# Patient Record
Sex: FEMALE
[De-identification: names, ages, dates, MRNs, and addresses within clinical notes are randomized per-mention and may not be internally consistent; named-entity substitution may affect disease eponyms.]

---

## 2017-01-18 ENCOUNTER — LAB SERVICES (OUTPATIENT)
Dept: OTHER | Age: 32
End: 2017-01-18

## 2017-01-18 ENCOUNTER — CHARTING TRANS (OUTPATIENT)
Dept: OTHER | Age: 32
End: 2017-01-18

## 2017-01-18 LAB
BILIRUBIN URINE: NEGATIVE
BLOOD URINE: ABNORMAL
CLARITY: CLEAR
COLOR: YELLOW
GLUCOSE QUALITATIVE U: NEGATIVE
HBV SURFACE AG SERPL QL IA: NEGATIVE
HEMATOCRIT: 36.8 % (ref 34–45)
HEMOGLOBIN: 13.2 G/DL (ref 11.2–15.7)
HIV1+2 AB SERPL QL IA: NEGATIVE
KETONES, URINE: NEGATIVE
LEUKOCYTE ESTERASE URINE: NEGATIVE
MEAN CORPUSCULAR HGB CONCENTRATION: 35.9 % (ref 32–36)
MEAN CORPUSCULAR HGB: 31.1 PG (ref 27–34)
MEAN CORPUSCULAR VOLUME: 86.8 FL (ref 79–95)
MEAN PLATELET VOLUME: 9.5 FL (ref 8.6–12.4)
NITRITE URINE: NEGATIVE
PH URINE: 6 (ref 5–7)
PLATELET COUNT: 306 10*3/UL (ref 150–400)
PROGESTERONE: 25.5 NG/ML
RED BLOOD CELL COUNT: 4.24 10*6/UL (ref 3.7–5.2)
RED CELL DISTRIBUTION WIDTH: 12 % (ref 11.3–14.8)
RPR SER QL: NORMAL
RUBV IGG SERPL QL IA: NORMAL [IU]/ML
SPECIFIC GRAVITY URINE: 1.02 (ref 1–1.03)
URINE PROTEIN, QUAL (DIPSTICK): NEGATIVE
UROBILINOGEN URINE: 0.2
WHITE BLOOD CELL COUNT: 8 10*3/UL (ref 4–10)

## 2017-01-19 ENCOUNTER — CHARTING TRANS (OUTPATIENT)
Dept: OTHER | Age: 32
End: 2017-01-19

## 2017-01-19 LAB
T GONDII IGG SER-ACNC: <0.5 UNITS/ML
T GONDII IGM SER-ACNC: 0.24 INDEX

## 2017-01-20 ENCOUNTER — CHARTING TRANS (OUTPATIENT)
Dept: OTHER | Age: 32
End: 2017-01-20

## 2017-01-20 LAB
ABO/RH(D): NORMAL
ANTIBODY SCREEN: NEGATIVE
B19V IGG SER IA-ACNC: 4.2 IV
B19V IGM SER IA-ACNC: 0.2 IV

## 2017-01-27 ENCOUNTER — CHARTING TRANS (OUTPATIENT)
Dept: OTHER | Age: 32
End: 2017-01-27

## 2017-02-01 ENCOUNTER — IMAGING SERVICES (OUTPATIENT)
Dept: OTHER | Age: 32
End: 2017-02-01

## 2017-02-06 ENCOUNTER — CHARTING TRANS (OUTPATIENT)
Dept: OTHER | Age: 32
End: 2017-02-06

## 2017-02-21 ENCOUNTER — CHARTING TRANS (OUTPATIENT)
Dept: OTHER | Age: 32
End: 2017-02-21

## 2017-03-13 ENCOUNTER — CHARTING TRANS (OUTPATIENT)
Dept: OTHER | Age: 32
End: 2017-03-13

## 2017-03-20 ENCOUNTER — CHARTING TRANS (OUTPATIENT)
Dept: OTHER | Age: 32
End: 2017-03-20

## 2017-03-22 ENCOUNTER — CHARTING TRANS (OUTPATIENT)
Dept: OBGYN | Age: 32
End: 2017-03-22

## 2017-04-01 ENCOUNTER — LAB SERVICES (OUTPATIENT)
Dept: OTHER | Age: 32
End: 2017-04-01

## 2017-04-01 ENCOUNTER — CHARTING TRANS (OUTPATIENT)
Dept: OTHER | Age: 32
End: 2017-04-01

## 2017-04-01 ENCOUNTER — CHARTING TRANS (OUTPATIENT)
Dept: OBGYN | Age: 32
End: 2017-04-01

## 2017-04-01 LAB
BILIRUBIN URINE: NEGATIVE
BLOOD URINE: NEGATIVE
CLARITY: CLEAR
COLOR: YELLOW
GLUCOSE QUALITATIVE U: NEGATIVE
KETONES, URINE: NEGATIVE
LEUKOCYTE ESTERASE URINE: NEGATIVE
NITRITE URINE: NEGATIVE
PH URINE: 6 (ref 5–7)
SPECIFIC GRAVITY URINE: <=1.005 (ref 1–1.03)
URINE PROTEIN, QUAL (DIPSTICK): NEGATIVE
UROBILINOGEN URINE: 0.2

## 2017-04-03 ENCOUNTER — CHARTING TRANS (OUTPATIENT)
Dept: OTHER | Age: 32
End: 2017-04-03

## 2017-04-15 ENCOUNTER — CHARTING TRANS (OUTPATIENT)
Dept: OTHER | Age: 32
End: 2017-04-15

## 2017-05-02 ENCOUNTER — CHARTING TRANS (OUTPATIENT)
Dept: OTHER | Age: 32
End: 2017-05-02

## 2017-05-02 ENCOUNTER — IMAGING SERVICES (OUTPATIENT)
Dept: OTHER | Age: 32
End: 2017-05-02

## 2017-05-16 ENCOUNTER — IMAGING SERVICES (OUTPATIENT)
Dept: OTHER | Age: 32
End: 2017-05-16

## 2017-06-06 ENCOUNTER — CHARTING TRANS (OUTPATIENT)
Dept: OBGYN | Age: 32
End: 2017-06-06

## 2017-06-14 ENCOUNTER — LAB SERVICES (OUTPATIENT)
Dept: OTHER | Age: 32
End: 2017-06-14

## 2017-06-14 LAB
BASOPHIL %: 0.1 % (ref 0–1.2)
BASOPHIL ABSOLUTE #: 0 10*3/UL (ref 0–0.1)
DIFFERENTIAL TYPE: ABNORMAL
EOSINOPHIL %: 2.3 % (ref 0–10)
EOSINOPHIL ABSOLUTE #: 0.2 10*3/UL (ref 0–0.5)
GLUCOSE 1H P 50 G GLC PO SERPL-MCNC: 88 MG/DL (ref 70–130)
HEMATOCRIT: 32.7 % (ref 34–45)
HEMOGLOBIN: 11 G/DL (ref 11.2–15.7)
LYMPH PERCENT: 12.7 % (ref 20.5–51.1)
LYMPHOCYTE ABSOLUTE #: 1.2 10*3/UL (ref 1.2–3.4)
MEAN CORPUSCULAR HGB CONCENTRATION: 33.6 % (ref 32–36)
MEAN CORPUSCULAR HGB: 29.6 PG (ref 27–34)
MEAN CORPUSCULAR VOLUME: 88.1 FL (ref 79–95)
MEAN PLATELET VOLUME: 10.6 FL (ref 8.6–12.4)
MONOCYTE ABSOLUTE #: 0.7 10*3/UL (ref 0.2–0.9)
MONOCYTE PERCENT: 6.8 % (ref 4.3–12.9)
NEUTROPHIL ABSOLUTE #: 7.6 10*3/UL (ref 1.4–6.5)
NEUTROPHIL PERCENT: 78.1 % (ref 34–73.5)
PLATELET COUNT: 240 10*3/UL (ref 150–400)
RED BLOOD CELL COUNT: 3.71 10*6/UL (ref 3.7–5.2)
RED CELL DISTRIBUTION WIDTH: 13 % (ref 11.3–14.8)
WHITE BLOOD CELL COUNT: 9.7 10*3/UL (ref 4–10)

## 2017-06-26 ENCOUNTER — CHARTING TRANS (OUTPATIENT)
Dept: OTHER | Age: 32
End: 2017-06-26

## 2017-07-10 ENCOUNTER — CHARTING TRANS (OUTPATIENT)
Dept: OTHER | Age: 32
End: 2017-07-10

## 2017-07-13 ENCOUNTER — CHARTING TRANS (OUTPATIENT)
Dept: OTHER | Age: 32
End: 2017-07-13

## 2017-07-31 ENCOUNTER — CHARTING TRANS (OUTPATIENT)
Dept: OTHER | Age: 32
End: 2017-07-31

## 2017-08-08 ENCOUNTER — CHARTING TRANS (OUTPATIENT)
Dept: OTHER | Age: 32
End: 2017-08-08

## 2017-08-09 ENCOUNTER — CHARTING TRANS (OUTPATIENT)
Dept: OBGYN | Age: 32
End: 2017-08-09

## 2017-08-17 ENCOUNTER — LAB SERVICES (OUTPATIENT)
Dept: OTHER | Age: 32
End: 2017-08-17

## 2017-08-17 ENCOUNTER — CHARTING TRANS (OUTPATIENT)
Dept: OBGYN | Age: 32
End: 2017-08-17

## 2017-08-24 ENCOUNTER — CHARTING TRANS (OUTPATIENT)
Dept: OTHER | Age: 32
End: 2017-08-24

## 2017-08-24 LAB — FINAL REPORT: ABNORMAL

## 2017-09-01 ENCOUNTER — CHARTING TRANS (OUTPATIENT)
Dept: OBGYN | Age: 32
End: 2017-09-01

## 2017-09-07 ENCOUNTER — CHARTING TRANS (OUTPATIENT)
Dept: OTHER | Age: 32
End: 2017-09-07

## 2017-09-14 ENCOUNTER — CHARTING TRANS (OUTPATIENT)
Dept: OTHER | Age: 32
End: 2017-09-14

## 2017-09-15 ENCOUNTER — LAB SERVICES (OUTPATIENT)
Dept: OTHER | Age: 32
End: 2017-09-15

## 2017-09-15 ENCOUNTER — CHARTING TRANS (OUTPATIENT)
Dept: OTHER | Age: 32
End: 2017-09-15

## 2017-09-18 ENCOUNTER — CHARTING TRANS (OUTPATIENT)
Dept: OTHER | Age: 32
End: 2017-09-18

## 2017-09-19 LAB — TXT: NORMAL

## 2017-09-21 ENCOUNTER — CHARTING TRANS (OUTPATIENT)
Dept: OTHER | Age: 32
End: 2017-09-21

## 2017-09-25 ENCOUNTER — CHARTING TRANS (OUTPATIENT)
Dept: OTHER | Age: 32
End: 2017-09-25

## 2017-09-26 ENCOUNTER — CHARTING TRANS (OUTPATIENT)
Dept: OTHER | Age: 32
End: 2017-09-26

## 2017-10-05 ENCOUNTER — CHARTING TRANS (OUTPATIENT)
Dept: OTHER | Age: 32
End: 2017-10-05

## 2017-10-20 ENCOUNTER — CHARTING TRANS (OUTPATIENT)
Dept: OTHER | Age: 32
End: 2017-10-20

## 2017-10-23 ENCOUNTER — LAB SERVICES (OUTPATIENT)
Dept: OTHER | Age: 32
End: 2017-10-23

## 2017-10-23 ENCOUNTER — HOSPITAL ENCOUNTER (OUTPATIENT)
Age: 32
Discharge: ANOTHER HOSPITAL | End: 2017-10-23
Attending: FAMILY MEDICINE
Payer: COMMERCIAL

## 2017-10-23 ENCOUNTER — CHARTING TRANS (OUTPATIENT)
Dept: OTHER | Age: 32
End: 2017-10-23

## 2017-10-23 VITALS
TEMPERATURE: 100 F | HEART RATE: 118 BPM | DIASTOLIC BLOOD PRESSURE: 76 MMHG | OXYGEN SATURATION: 98 % | SYSTOLIC BLOOD PRESSURE: 94 MMHG | RESPIRATION RATE: 20 BRPM | WEIGHT: 150 LBS

## 2017-10-23 DIAGNOSIS — I95.9 HYPOTENSION, UNSPECIFIED HYPOTENSION TYPE: ICD-10-CM

## 2017-10-23 DIAGNOSIS — M79.602 PAIN IN BOTH UPPER EXTREMITIES: ICD-10-CM

## 2017-10-23 DIAGNOSIS — R00.0 TACHYCARDIA: ICD-10-CM

## 2017-10-23 DIAGNOSIS — M54.9 ACUTE UPPER BACK PAIN: ICD-10-CM

## 2017-10-23 DIAGNOSIS — R42 DIZZINESS: ICD-10-CM

## 2017-10-23 DIAGNOSIS — N61.0 ACUTE MASTITIS OF LEFT BREAST: Primary | ICD-10-CM

## 2017-10-23 DIAGNOSIS — M79.601 PAIN IN BOTH UPPER EXTREMITIES: ICD-10-CM

## 2017-10-23 LAB — MRSA SCREEN PCR: NORMAL

## 2017-10-23 PROCEDURE — 99205 OFFICE O/P NEW HI 60 MIN: CPT

## 2017-10-23 PROCEDURE — 93010 ELECTROCARDIOGRAM REPORT: CPT

## 2017-10-23 PROCEDURE — 93005 ELECTROCARDIOGRAM TRACING: CPT

## 2017-10-23 RX ORDER — IBUPROFEN 400 MG/1
400 TABLET ORAL EVERY 6 HOURS PRN
COMMUNITY
End: 2019-02-14

## 2017-10-23 RX ORDER — DICLOXACILLIN SODIUM 250 MG/1
250 CAPSULE ORAL 4 TIMES DAILY
COMMUNITY
End: 2018-10-22

## 2017-10-23 RX ORDER — SODIUM CHLORIDE 9 MG/ML
INJECTION, SOLUTION INTRAVENOUS CONTINUOUS
Status: DISCONTINUED | OUTPATIENT
Start: 2017-10-23 | End: 2017-10-23

## 2017-10-23 NOTE — ED INITIAL ASSESSMENT (HPI)
X1 WK Pt c/o constant sharp back pain that is starting to radiate into shoulder/arm/hands. Thurs Pt was started on Dicloxacillin for a clogged milk duct. +fevers    10/22 Pt c/o light headedness.

## 2017-10-23 NOTE — ED PROVIDER NOTES
Patient Seen in: [id number] [facility]    History   Patient presents with:  Back Pain (musculoskeletal)    Stated Complaint: infected milk duct/neck back pain    HPI    50-year-old female who is currently 6 weeks postpartum presents to the clin Physical Exam    General appearance: alert, appears stated age and cooperative. Patient is laying on the exam table. Becomes dizzy and lightheaded when she sits up.   Head: Normocephalic, without obvious abnormality, atraumatic  Eyes: conjunctivae she is postpartum there is always concern for pulmonary embolism. Patient denies any chest pain or shortness of breath at present. She needs to be ruled out for breast abscess/sepsis and also need to be ruled out for pulmonary embolism.  Recommend jose alfredo

## 2017-10-24 ENCOUNTER — CHARTING TRANS (OUTPATIENT)
Dept: OTHER | Age: 32
End: 2017-10-24

## 2017-10-24 ENCOUNTER — LAB SERVICES (OUTPATIENT)
Dept: OTHER | Age: 32
End: 2017-10-24

## 2017-10-25 LAB
CULTURE URINE: NORMAL
CULTURE WOUND WITH GRAM: ABNORMAL

## 2017-10-26 ENCOUNTER — CHARTING TRANS (OUTPATIENT)
Dept: OTHER | Age: 32
End: 2017-10-26

## 2017-10-27 ENCOUNTER — LAB SERVICES (OUTPATIENT)
Dept: OTHER | Age: 32
End: 2017-10-27

## 2017-10-27 LAB — CLOSTRIDIUM DIFFICILE TOXIN PCR: NORMAL

## 2017-10-28 ENCOUNTER — LAB SERVICES (OUTPATIENT)
Dept: OTHER | Age: 32
End: 2017-10-28

## 2017-10-28 ENCOUNTER — CHARTING TRANS (OUTPATIENT)
Dept: OTHER | Age: 32
End: 2017-10-28

## 2017-10-28 LAB
CULTURE BLOOD: NORMAL
CULTURE BLOOD: NORMAL

## 2017-10-30 ENCOUNTER — CHARTING TRANS (OUTPATIENT)
Dept: OTHER | Age: 32
End: 2017-10-30

## 2017-10-30 ENCOUNTER — LAB SERVICES (OUTPATIENT)
Dept: OTHER | Age: 32
End: 2017-10-30

## 2017-10-30 LAB
CULTURE ANAEROBIC: NORMAL
CULTURE BODY FLUID WITH GRAM: ABNORMAL
DIFFERENTIAL TYPE: ABNORMAL
HEMATOCRIT: 35.4 % (ref 34–45)
HEMOGLOBIN: 12.2 G/DL (ref 11.2–15.7)
MEAN CORPUSCULAR HGB CONCENTRATION: 34.5 % (ref 32–36)
MEAN CORPUSCULAR HGB: 28 PG (ref 27–34)
MEAN CORPUSCULAR VOLUME: 81.4 FL (ref 79–95)
MEAN PLATELET VOLUME: 10.1 FL (ref 8.6–12.4)
PLATELET COUNT: 163 10*3/UL (ref 150–400)
RED BLOOD CELL COUNT: 4.35 10*6/UL (ref 3.7–5.2)
RED CELL DISTRIBUTION WIDTH: 16.4 % (ref 11.3–14.8)
WHITE BLOOD CELL COUNT: 16 10*3/UL (ref 4–10)

## 2017-11-02 LAB
CULTURE ANAEROBIC: NORMAL
CULTURE BLOOD: NORMAL
CULTURE BLOOD: NORMAL

## 2017-11-03 ENCOUNTER — CHARTING TRANS (OUTPATIENT)
Dept: OTHER | Age: 32
End: 2017-11-03

## 2017-11-06 ENCOUNTER — CHARTING TRANS (OUTPATIENT)
Dept: OTHER | Age: 32
End: 2017-11-06

## 2017-11-06 ENCOUNTER — IMAGING SERVICES (OUTPATIENT)
Dept: OTHER | Age: 32
End: 2017-11-06

## 2017-11-10 ENCOUNTER — CHARTING TRANS (OUTPATIENT)
Dept: SURGERY | Age: 32
End: 2017-11-10

## 2017-11-14 ENCOUNTER — CHARTING TRANS (OUTPATIENT)
Dept: OTHER | Age: 32
End: 2017-11-14

## 2017-11-27 ENCOUNTER — CHARTING TRANS (OUTPATIENT)
Dept: OTHER | Age: 32
End: 2017-11-27

## 2017-12-01 ENCOUNTER — CHARTING TRANS (OUTPATIENT)
Dept: OTHER | Age: 32
End: 2017-12-01

## 2017-12-04 ENCOUNTER — CHARTING TRANS (OUTPATIENT)
Dept: OTHER | Age: 32
End: 2017-12-04

## 2017-12-05 ENCOUNTER — CHARTING TRANS (OUTPATIENT)
Dept: OTHER | Age: 32
End: 2017-12-05

## 2017-12-11 ENCOUNTER — CHARTING TRANS (OUTPATIENT)
Dept: SURGERY | Age: 32
End: 2017-12-11

## 2018-01-15 ENCOUNTER — CHARTING TRANS (OUTPATIENT)
Dept: OTHER | Age: 33
End: 2018-01-15

## 2018-02-07 ENCOUNTER — CHARTING TRANS (OUTPATIENT)
Dept: OTHER | Age: 33
End: 2018-02-07

## 2018-02-07 ENCOUNTER — IMAGING SERVICES (OUTPATIENT)
Dept: OTHER | Age: 33
End: 2018-02-07

## 2018-02-08 ENCOUNTER — CHARTING TRANS (OUTPATIENT)
Dept: OTHER | Age: 33
End: 2018-02-08

## 2018-02-13 ENCOUNTER — CHARTING TRANS (OUTPATIENT)
Dept: OTHER | Age: 33
End: 2018-02-13

## 2018-02-19 ENCOUNTER — CHARTING TRANS (OUTPATIENT)
Dept: OTHER | Age: 33
End: 2018-02-19

## 2018-03-10 ENCOUNTER — MYAURORA ACCOUNT LINK (OUTPATIENT)
Dept: OTHER | Age: 33
End: 2018-03-10

## 2018-03-10 ENCOUNTER — CHARTING TRANS (OUTPATIENT)
Dept: OTHER | Age: 33
End: 2018-03-10

## 2018-05-01 ENCOUNTER — CHARTING TRANS (OUTPATIENT)
Dept: OTHER | Age: 33
End: 2018-05-01

## 2018-05-09 ENCOUNTER — CHARTING TRANS (OUTPATIENT)
Dept: OTHER | Age: 33
End: 2018-05-09

## 2018-05-30 ENCOUNTER — CHARTING TRANS (OUTPATIENT)
Dept: OTHER | Age: 33
End: 2018-05-30

## 2018-06-29 ENCOUNTER — CHARTING TRANS (OUTPATIENT)
Dept: OTHER | Age: 33
End: 2018-06-29

## 2018-07-25 ENCOUNTER — LAB SERVICES (OUTPATIENT)
Dept: OTHER | Age: 33
End: 2018-07-25

## 2018-07-25 ENCOUNTER — CHARTING TRANS (OUTPATIENT)
Dept: OTHER | Age: 33
End: 2018-07-25

## 2018-07-30 LAB — PATH REPORT: NORMAL

## 2018-08-15 ENCOUNTER — CHARTING TRANS (OUTPATIENT)
Dept: OTHER | Age: 33
End: 2018-08-15

## 2018-08-17 ENCOUNTER — MYAURORA ACCOUNT LINK (OUTPATIENT)
Dept: OTHER | Age: 33
End: 2018-08-17

## 2018-08-20 ENCOUNTER — CHARTING TRANS (OUTPATIENT)
Dept: OTHER | Age: 33
End: 2018-08-20

## 2018-09-10 ENCOUNTER — CHARTING TRANS (OUTPATIENT)
Dept: OTHER | Age: 33
End: 2018-09-10

## 2018-10-18 ENCOUNTER — CHARTING TRANS (OUTPATIENT)
Dept: OTHER | Age: 33
End: 2018-10-18

## 2018-10-18 ENCOUNTER — MYAURORA ACCOUNT LINK (OUTPATIENT)
Dept: OTHER | Age: 33
End: 2018-10-18

## 2018-10-22 ENCOUNTER — CHARTING TRANS (OUTPATIENT)
Dept: OTHER | Age: 33
End: 2018-10-22

## 2018-10-22 ENCOUNTER — HOSPITAL ENCOUNTER (OUTPATIENT)
Age: 33
Discharge: HOME | End: 2018-10-22
Attending: EMERGENCY MEDICINE
Payer: COMMERCIAL

## 2018-10-22 VITALS
DIASTOLIC BLOOD PRESSURE: 82 MMHG | HEART RATE: 90 BPM | BODY MASS INDEX: 25.83 KG/M2 | WEIGHT: 155 LBS | TEMPERATURE: 98 F | HEIGHT: 65 IN | OXYGEN SATURATION: 99 % | RESPIRATION RATE: 16 BRPM | SYSTOLIC BLOOD PRESSURE: 130 MMHG

## 2018-10-22 DIAGNOSIS — R21 RASH: Primary | ICD-10-CM

## 2018-10-22 PROCEDURE — 87430 STREP A AG IA: CPT | Performed by: EMERGENCY MEDICINE

## 2018-10-22 PROCEDURE — 99213 OFFICE O/P EST LOW 20 MIN: CPT

## 2018-10-22 PROCEDURE — 99214 OFFICE O/P EST MOD 30 MIN: CPT

## 2018-10-22 PROCEDURE — 87081 CULTURE SCREEN ONLY: CPT | Performed by: EMERGENCY MEDICINE

## 2018-10-22 RX ORDER — MULTIVITAMIN
TABLET ORAL
COMMUNITY
End: 2018-10-22

## 2018-10-22 RX ORDER — CLOTRIMAZOLE AND BETAMETHASONE DIPROPIONATE 10; .64 MG/G; MG/G
CREAM TOPICAL
Qty: 45 G | Refills: 0 | Status: SHIPPED | OUTPATIENT
Start: 2018-10-22 | End: 2019-02-14

## 2018-10-22 RX ORDER — CHOLECALCIFEROL (VITAMIN D3) 25 MCG
1 TABLET,CHEWABLE ORAL DAILY
COMMUNITY

## 2018-10-22 NOTE — ED PROVIDER NOTES
Patient presents with:  Rash Skin Problem (integumentary)    HPI:     Lazaro Lewis is a 33year old female who presents with chief complaint of rash. Has had a rash to the bilateral axillary region \"for a while. \"  Went to dermatologist in the last of the body most resembles a allergic response, hive-like but papular. Would recommend changing topical and increasing antihistamines - zantac, claritin, benadryl. Follow up with dermatologist.      Assessment/Plan:     Diagnosis:  Rash    Plan:  1.   Lot

## 2018-10-22 NOTE — ED INITIAL ASSESSMENT (HPI)
Pt sts began with rash to both axilla several weeks ago. 10/18 saw Derm- dx with psoriasis, prescribed steroid cream. 10/19 awoke with rash down both arms and across chest. Saturday morning, rash \"everywhere. \" Rash is itchy and interfering with sleep.  Christi Olsen

## 2018-10-24 ENCOUNTER — CHARTING TRANS (OUTPATIENT)
Dept: OTHER | Age: 33
End: 2018-10-24

## 2018-10-31 ENCOUNTER — CHARTING TRANS (OUTPATIENT)
Dept: OTHER | Age: 33
End: 2018-10-31

## 2018-10-31 ENCOUNTER — MYAURORA ACCOUNT LINK (OUTPATIENT)
Dept: OTHER | Age: 33
End: 2018-10-31

## 2018-11-23 ENCOUNTER — IMAGING SERVICES (OUTPATIENT)
Dept: OTHER | Age: 33
End: 2018-11-23

## 2018-11-27 ENCOUNTER — CHARTING TRANS (OUTPATIENT)
Dept: OTHER | Age: 33
End: 2018-11-27

## 2018-11-27 VITALS
OXYGEN SATURATION: 100 % | HEIGHT: 65 IN | WEIGHT: 152 LBS | SYSTOLIC BLOOD PRESSURE: 116 MMHG | HEIGHT: 65 IN | HEART RATE: 92 BPM | BODY MASS INDEX: 24.32 KG/M2 | DIASTOLIC BLOOD PRESSURE: 72 MMHG | BODY MASS INDEX: 25.33 KG/M2 | RESPIRATION RATE: 16 BRPM | WEIGHT: 146 LBS

## 2018-11-27 VITALS
SYSTOLIC BLOOD PRESSURE: 102 MMHG | HEIGHT: 65 IN | DIASTOLIC BLOOD PRESSURE: 64 MMHG | BODY MASS INDEX: 25.16 KG/M2 | HEART RATE: 87 BPM | OXYGEN SATURATION: 96 % | WEIGHT: 151 LBS | RESPIRATION RATE: 16 BRPM

## 2018-11-27 VITALS — HEIGHT: 65 IN | BODY MASS INDEX: 24.49 KG/M2 | WEIGHT: 147 LBS

## 2018-11-28 VITALS
DIASTOLIC BLOOD PRESSURE: 92 MMHG | HEIGHT: 65 IN | WEIGHT: 164 LBS | DIASTOLIC BLOOD PRESSURE: 70 MMHG | HEIGHT: 62 IN | SYSTOLIC BLOOD PRESSURE: 108 MMHG | WEIGHT: 153 LBS | BODY MASS INDEX: 27.32 KG/M2 | BODY MASS INDEX: 28.16 KG/M2 | SYSTOLIC BLOOD PRESSURE: 154 MMHG

## 2018-11-28 VITALS
BODY MASS INDEX: 28.82 KG/M2 | WEIGHT: 179 LBS | SYSTOLIC BLOOD PRESSURE: 110 MMHG | WEIGHT: 173 LBS | SYSTOLIC BLOOD PRESSURE: 108 MMHG | WEIGHT: 173 LBS | WEIGHT: 167 LBS | SYSTOLIC BLOOD PRESSURE: 110 MMHG | DIASTOLIC BLOOD PRESSURE: 62 MMHG | WEIGHT: 175 LBS | SYSTOLIC BLOOD PRESSURE: 112 MMHG | HEIGHT: 65 IN | SYSTOLIC BLOOD PRESSURE: 130 MMHG | DIASTOLIC BLOOD PRESSURE: 90 MMHG | BODY MASS INDEX: 27.79 KG/M2 | DIASTOLIC BLOOD PRESSURE: 96 MMHG | DIASTOLIC BLOOD PRESSURE: 76 MMHG | DIASTOLIC BLOOD PRESSURE: 76 MMHG | WEIGHT: 179 LBS | BODY MASS INDEX: 29.79 KG/M2 | DIASTOLIC BLOOD PRESSURE: 62 MMHG | SYSTOLIC BLOOD PRESSURE: 140 MMHG | BODY MASS INDEX: 29.79 KG/M2

## 2018-11-28 VITALS — WEIGHT: 152 LBS | SYSTOLIC BLOOD PRESSURE: 112 MMHG | DIASTOLIC BLOOD PRESSURE: 68 MMHG

## 2018-11-28 VITALS — SYSTOLIC BLOOD PRESSURE: 110 MMHG | DIASTOLIC BLOOD PRESSURE: 62 MMHG | WEIGHT: 149 LBS

## 2018-11-28 VITALS
BODY MASS INDEX: 29.99 KG/M2 | WEIGHT: 180 LBS | DIASTOLIC BLOOD PRESSURE: 62 MMHG | HEIGHT: 65 IN | SYSTOLIC BLOOD PRESSURE: 112 MMHG

## 2018-11-28 VITALS
SYSTOLIC BLOOD PRESSURE: 116 MMHG | WEIGHT: 166 LBS | BODY MASS INDEX: 28.49 KG/M2 | DIASTOLIC BLOOD PRESSURE: 70 MMHG | HEIGHT: 65 IN | SYSTOLIC BLOOD PRESSURE: 102 MMHG | WEIGHT: 171 LBS | DIASTOLIC BLOOD PRESSURE: 66 MMHG

## 2018-11-28 VITALS — WEIGHT: 148 LBS | SYSTOLIC BLOOD PRESSURE: 118 MMHG | DIASTOLIC BLOOD PRESSURE: 74 MMHG

## 2018-11-28 VITALS — WEIGHT: 160 LBS | DIASTOLIC BLOOD PRESSURE: 60 MMHG | SYSTOLIC BLOOD PRESSURE: 110 MMHG

## 2018-11-28 VITALS — SYSTOLIC BLOOD PRESSURE: 120 MMHG | WEIGHT: 155 LBS | DIASTOLIC BLOOD PRESSURE: 70 MMHG

## 2018-11-28 VITALS
DIASTOLIC BLOOD PRESSURE: 60 MMHG | WEIGHT: 151 LBS | BODY MASS INDEX: 25.16 KG/M2 | SYSTOLIC BLOOD PRESSURE: 90 MMHG | HEIGHT: 65 IN

## 2018-11-28 VITALS — DIASTOLIC BLOOD PRESSURE: 62 MMHG | WEIGHT: 164 LBS | SYSTOLIC BLOOD PRESSURE: 108 MMHG

## 2018-11-29 VITALS — DIASTOLIC BLOOD PRESSURE: 62 MMHG | WEIGHT: 146 LBS | SYSTOLIC BLOOD PRESSURE: 110 MMHG

## 2018-11-29 VITALS
HEIGHT: 64 IN | SYSTOLIC BLOOD PRESSURE: 114 MMHG | WEIGHT: 145 LBS | BODY MASS INDEX: 24.75 KG/M2 | DIASTOLIC BLOOD PRESSURE: 64 MMHG

## 2018-12-18 ENCOUNTER — E-ADVICE (OUTPATIENT)
Dept: DERMATOLOGY | Age: 33
End: 2018-12-18

## 2018-12-19 ENCOUNTER — OFFICE VISIT (OUTPATIENT)
Dept: DERMATOLOGY | Age: 33
End: 2018-12-19

## 2018-12-19 DIAGNOSIS — L40.9 PSORIASIS, UNSPECIFIED: Primary | ICD-10-CM

## 2018-12-19 PROCEDURE — 99213 OFFICE O/P EST LOW 20 MIN: CPT | Performed by: DERMATOLOGY

## 2018-12-19 RX ORDER — PIMECROLIMUS 10 MG/G
1 CREAM TOPICAL 2 TIMES DAILY
COMMUNITY
Start: 2018-11-28 | End: 2021-02-07 | Stop reason: ALTCHOICE

## 2018-12-19 RX ORDER — HALOBETASOL PROPIONATE 0.05 %
OINTMENT (GRAM) TOPICAL
Qty: 50 G | Refills: 0 | Status: SHIPPED | OUTPATIENT
Start: 2018-12-19 | End: 2019-06-15 | Stop reason: ALTCHOICE

## 2018-12-22 ENCOUNTER — E-ADVICE (OUTPATIENT)
Dept: DERMATOLOGY | Age: 33
End: 2018-12-22

## 2018-12-22 ENCOUNTER — TELEPHONE (OUTPATIENT)
Dept: DERMATOLOGY | Age: 33
End: 2018-12-22

## 2018-12-22 ENCOUNTER — HOSPITAL ENCOUNTER (OUTPATIENT)
Age: 33
Discharge: HOME | End: 2018-12-22
Attending: FAMILY MEDICINE
Payer: COMMERCIAL

## 2018-12-22 VITALS
OXYGEN SATURATION: 100 % | HEART RATE: 67 BPM | TEMPERATURE: 98 F | RESPIRATION RATE: 16 BRPM | WEIGHT: 150 LBS | HEIGHT: 65 IN | SYSTOLIC BLOOD PRESSURE: 127 MMHG | DIASTOLIC BLOOD PRESSURE: 76 MMHG | BODY MASS INDEX: 24.99 KG/M2

## 2018-12-22 DIAGNOSIS — R21 RASH AND NONSPECIFIC SKIN ERUPTION: Primary | ICD-10-CM

## 2018-12-22 PROCEDURE — 99213 OFFICE O/P EST LOW 20 MIN: CPT

## 2018-12-22 PROCEDURE — 99214 OFFICE O/P EST MOD 30 MIN: CPT

## 2018-12-22 RX ORDER — METHYLPREDNISOLONE 4 MG/1
TABLET ORAL
Qty: 21 TABLET | Refills: 0 | Status: SHIPPED | OUTPATIENT
Start: 2018-12-22 | End: 2019-02-14

## 2018-12-22 RX ORDER — HALOBETASOL PROPIONATE 0.05 %
OINTMENT (GRAM) TOPICAL 2 TIMES DAILY
COMMUNITY
End: 2019-02-14

## 2018-12-22 NOTE — ED INITIAL ASSESSMENT (HPI)
Pt sts this morning began with an itchy rash to right forearm and has since spread \"everywhere. \"  Sts has used a new rx steroid cream to amandeep axilla for psoriasis for the past 2 nights.  Has had reacted similarly with 2 other rx steroid creams in the past.

## 2018-12-22 NOTE — ED PROVIDER NOTES
Patient Seen in: [facility] [place]    History   Patient presents with:  Rash Skin Problem (integumentary)    Stated Complaint: Generalized rash/ allergic reaction    HPI    **41-year-old female with a history of psoriasis, cardiomyopathy pr Physical Exam    General appearance: alert, appears stated age and cooperative  Head: Normocephalic, without obvious abnormality, atraumatic  Eyes: conjunctivae/corneas clear. PERRL, EOM's intact. Fundi benign.   Ears: normal TM's and external ear c R-0

## 2018-12-24 ENCOUNTER — OFFICE VISIT (OUTPATIENT)
Dept: DERMATOLOGY | Age: 33
End: 2018-12-24

## 2018-12-24 ENCOUNTER — TELEPHONE (OUTPATIENT)
Dept: DERMATOLOGY | Age: 33
End: 2018-12-24

## 2018-12-24 DIAGNOSIS — L30.9 DERMATITIS: Primary | ICD-10-CM

## 2018-12-24 PROCEDURE — 96372 THER/PROPH/DIAG INJ SC/IM: CPT | Performed by: PHYSICIAN ASSISTANT

## 2018-12-24 PROCEDURE — 99213 OFFICE O/P EST LOW 20 MIN: CPT | Performed by: PHYSICIAN ASSISTANT

## 2018-12-24 RX ORDER — HYDROXYZINE HYDROCHLORIDE 25 MG/1
TABLET, FILM COATED ORAL
Qty: 30 TABLET | Refills: 2 | Status: SHIPPED | OUTPATIENT
Start: 2018-12-24 | End: 2018-12-26 | Stop reason: DRUGHIGH

## 2018-12-24 RX ORDER — TRIAMCINOLONE ACETONIDE 40 MG/ML
60 INJECTION, SUSPENSION INTRA-ARTICULAR; INTRAMUSCULAR ONCE
Status: DISCONTINUED | OUTPATIENT
Start: 2018-12-24 | End: 2018-12-24 | Stop reason: CLARIF

## 2018-12-24 RX ORDER — METOPROLOL SUCCINATE 25 MG/1
TABLET, EXTENDED RELEASE ORAL
COMMUNITY
Start: 2018-05-30 | End: 2019-06-14 | Stop reason: ALTCHOICE

## 2018-12-24 RX ORDER — TRIAMCINOLONE ACETONIDE 40 MG/ML
60 INJECTION, SUSPENSION INTRA-ARTICULAR; INTRAMUSCULAR ONCE
Status: CANCELLED | OUTPATIENT
Start: 2018-12-24

## 2018-12-24 RX ORDER — TRIAMCINOLONE ACETONIDE 40 MG/ML
60 INJECTION, SUSPENSION INTRA-ARTICULAR; INTRAMUSCULAR ONCE
Status: CANCELLED | OUTPATIENT
Start: 2018-12-24 | End: 2018-12-24

## 2018-12-24 RX ORDER — TRIAMCINOLONE ACETONIDE 40 MG/ML
40 INJECTION, SUSPENSION INTRA-ARTICULAR; INTRAMUSCULAR ONCE
Status: CANCELLED | OUTPATIENT
Start: 2018-12-24 | End: 2018-12-24

## 2018-12-24 RX ORDER — TRIAMCINOLONE ACETONIDE 40 MG/ML
60 INJECTION, SUSPENSION INTRA-ARTICULAR; INTRAMUSCULAR ONCE
Status: COMPLETED | OUTPATIENT
Start: 2018-12-24 | End: 2018-12-24

## 2018-12-24 RX ADMIN — TRIAMCINOLONE ACETONIDE 60 MG: 40 INJECTION, SUSPENSION INTRA-ARTICULAR; INTRAMUSCULAR at 14:11

## 2018-12-26 ENCOUNTER — E-ADVICE (OUTPATIENT)
Dept: OTHER | Age: 33
End: 2018-12-26

## 2018-12-26 RX ORDER — HYDROXYZINE 50 MG/1
TABLET, FILM COATED ORAL
Qty: 30 TABLET | Refills: 0 | Status: SHIPPED | OUTPATIENT
Start: 2018-12-26 | End: 2018-12-26 | Stop reason: SDUPTHER

## 2018-12-26 RX ORDER — HYDROXYZINE 50 MG/1
TABLET, FILM COATED ORAL
Qty: 30 TABLET | Refills: 0 | Status: SHIPPED | OUTPATIENT
Start: 2018-12-26 | End: 2019-06-15 | Stop reason: ALTCHOICE

## 2018-12-27 ENCOUNTER — NURSE ONLY (OUTPATIENT)
Dept: DERMATOLOGY | Age: 33
End: 2018-12-27

## 2018-12-27 DIAGNOSIS — L98.9 DERMATOSIS: Primary | ICD-10-CM

## 2018-12-27 PROCEDURE — 99213 OFFICE O/P EST LOW 20 MIN: CPT | Performed by: PHYSICIAN ASSISTANT

## 2018-12-27 PROCEDURE — 11100 BIOPSY OF SKIN LESION: CPT | Performed by: PHYSICIAN ASSISTANT

## 2018-12-27 RX ORDER — PREDNISONE 10 MG/1
TABLET ORAL
Qty: 30 TABLET | Refills: 0 | Status: SHIPPED | OUTPATIENT
Start: 2018-12-27 | End: 2019-06-14 | Stop reason: ALTCHOICE

## 2018-12-28 ENCOUNTER — E-ADVICE (OUTPATIENT)
Dept: OTHER | Age: 33
End: 2018-12-28

## 2018-12-28 ENCOUNTER — E-ADVICE (OUTPATIENT)
Dept: DERMATOLOGY | Age: 33
End: 2018-12-28

## 2019-01-04 ENCOUNTER — E-ADVICE (OUTPATIENT)
Dept: OTHER | Age: 34
End: 2019-01-04

## 2019-01-04 LAB — PATH REPORT PLASRBC-IMP: NORMAL

## 2019-01-24 ENCOUNTER — IMAGING SERVICES (OUTPATIENT)
Dept: OTHER | Age: 34
End: 2019-01-24

## 2019-02-07 ENCOUNTER — APPOINTMENT (OUTPATIENT)
Dept: ALLERGY | Age: 34
End: 2019-02-07

## 2019-02-14 ENCOUNTER — OFFICE VISIT (OUTPATIENT)
Dept: OBGYN CLINIC | Facility: CLINIC | Age: 34
End: 2019-02-14
Payer: COMMERCIAL

## 2019-02-14 VITALS
SYSTOLIC BLOOD PRESSURE: 120 MMHG | BODY MASS INDEX: 26.89 KG/M2 | WEIGHT: 161.38 LBS | DIASTOLIC BLOOD PRESSURE: 80 MMHG | HEIGHT: 65 IN | HEART RATE: 79 BPM

## 2019-02-14 DIAGNOSIS — Z12.4 CERVICAL CANCER SCREENING: ICD-10-CM

## 2019-02-14 DIAGNOSIS — Z86.79 HISTORY OF CARDIOMYOPATHY: ICD-10-CM

## 2019-02-14 DIAGNOSIS — Z00.00 ROUTINE HEALTH MAINTENANCE: ICD-10-CM

## 2019-02-14 DIAGNOSIS — Z12.39 BREAST CANCER SCREENING: ICD-10-CM

## 2019-02-14 DIAGNOSIS — Z01.419 WELL WOMAN EXAM: Primary | ICD-10-CM

## 2019-02-14 PROCEDURE — 99385 PREV VISIT NEW AGE 18-39: CPT | Performed by: OBSTETRICS & GYNECOLOGY

## 2019-02-14 PROCEDURE — 87624 HPV HI-RISK TYP POOLED RSLT: CPT | Performed by: OBSTETRICS & GYNECOLOGY

## 2019-02-14 NOTE — PROGRESS NOTES
GYN H&P     2019  4:04 PM    CC: Patient presents with:  Physical: Pt will like to dsicuss getting pregant      HPI: patient is a 29year old  here for her annual gyne exam.    Pt is new to our office.  She reports all Normal pap smears, no current facility-administered medications on file prior to visit. History reviewed. No pertinent family history.   Social History    Socioeconomic History      Marital status:       Spouse name: Not on file      Number of children: Not on file rashes, no suspicious lesions  HEENT: normal  NECK: no thyroidmegaly, no adenopathy  LUNGS: clear to auscultation  CARDIOVASCULAR: normal S1, S2, RRR  BREASTS: soft, nontendder, no palpable masses or nodes, no nipple discharge, no skin changes, no axillary

## 2019-02-18 LAB — HPV I/H RISK 1 DNA SPEC QL NAA+PROBE: NEGATIVE

## 2019-02-27 PROBLEM — A41.02: Status: ACTIVE | Noted: 2017-10-29

## 2019-02-27 PROBLEM — N61.1 BREAST ABSCESS: Status: ACTIVE | Noted: 2017-11-03

## 2019-02-27 PROBLEM — N61.0: Status: ACTIVE | Noted: 2017-11-03

## 2019-02-27 PROBLEM — A49.02 MRSA (METHICILLIN RESISTANT STAPHYLOCOCCUS AUREUS): Status: ACTIVE | Noted: 2017-11-03

## 2019-02-27 PROBLEM — I96: Status: ACTIVE | Noted: 2017-11-03

## 2019-02-27 PROBLEM — R65.21: Status: ACTIVE | Noted: 2017-10-29

## 2019-03-04 ENCOUNTER — OFFICE VISIT (OUTPATIENT)
Dept: OBGYN | Age: 34
End: 2019-03-04

## 2019-03-04 ENCOUNTER — LAB SERVICES (OUTPATIENT)
Dept: LAB | Age: 34
End: 2019-03-04

## 2019-03-04 VITALS — DIASTOLIC BLOOD PRESSURE: 72 MMHG | WEIGHT: 154 LBS | SYSTOLIC BLOOD PRESSURE: 110 MMHG | BODY MASS INDEX: 25.63 KG/M2

## 2019-03-04 DIAGNOSIS — N97.0 ANOVULATION: ICD-10-CM

## 2019-03-04 DIAGNOSIS — N92.6 IRREGULAR MENSTRUAL CYCLE: ICD-10-CM

## 2019-03-04 DIAGNOSIS — N97.0 ANOVULATION: Primary | ICD-10-CM

## 2019-03-04 LAB
ALBUMIN SERPL-MCNC: 5 G/DL (ref 3.6–5.1)
ALP SERPL-CCNC: 68 U/L (ref 45–130)
ALT SERPL W/O P-5'-P-CCNC: 19 U/L (ref 4–38)
AST SERPL-CCNC: 27 U/L (ref 14–43)
BILIRUB SERPL-MCNC: 1.1 MG/DL (ref 0–1.3)
BUN SERPL-MCNC: 17 MG/DL (ref 7–20)
CALCIUM SERPL-MCNC: 10.1 MG/DL (ref 8.6–10.6)
CHLORIDE SERPL-SCNC: 101 MMOL/L (ref 96–107)
CO2 SERPL-SCNC: 30 MMOL/L (ref 22–32)
CREAT SERPL-MCNC: 0.6 MG/DL (ref 0.5–1.4)
FSH SERPL-ACNC: 4.09 M[IU]/ML (ref 2–25)
GFR SERPL CREATININE-BSD FRML MDRD: >60 ML/MIN/{1.73M2}
GFR SERPL CREATININE-BSD FRML MDRD: >60 ML/MIN/{1.73M2}
GLUCOSE SERPL-MCNC: 92 MG/DL (ref 70–200)
LH SERPL-ACNC: 9.67 M[IU]/ML
POTASSIUM SERPL-SCNC: 4.8 MMOL/L (ref 3.5–5.3)
PROT SERPL-MCNC: 7.5 G/DL (ref 6.4–8.5)
SODIUM SERPL-SCNC: 138 MMOL/L (ref 136–146)
TSH SERPL DL<=0.05 MIU/L-ACNC: 1.6 M[IU]/L (ref 0.3–4.82)

## 2019-03-04 PROCEDURE — 99214 OFFICE O/P EST MOD 30 MIN: CPT | Performed by: NURSE PRACTITIONER

## 2019-03-04 PROCEDURE — 83001 ASSAY OF GONADOTROPIN (FSH): CPT | Performed by: NURSE PRACTITIONER

## 2019-03-04 PROCEDURE — 84443 ASSAY THYROID STIM HORMONE: CPT | Performed by: NURSE PRACTITIONER

## 2019-03-04 PROCEDURE — 83002 ASSAY OF GONADOTROPIN (LH): CPT | Performed by: NURSE PRACTITIONER

## 2019-03-04 PROCEDURE — 80053 COMPREHEN METABOLIC PANEL: CPT | Performed by: NURSE PRACTITIONER

## 2019-03-04 PROCEDURE — 36415 COLL VENOUS BLD VENIPUNCTURE: CPT | Performed by: NURSE PRACTITIONER

## 2019-03-05 VITALS — WEIGHT: 150 LBS | BODY MASS INDEX: 24.99 KG/M2 | HEIGHT: 65 IN

## 2019-03-06 VITALS
HEIGHT: 65 IN | SYSTOLIC BLOOD PRESSURE: 132 MMHG | RESPIRATION RATE: 15 BRPM | WEIGHT: 151 LBS | DIASTOLIC BLOOD PRESSURE: 74 MMHG | HEART RATE: 76 BPM | BODY MASS INDEX: 25.16 KG/M2 | OXYGEN SATURATION: 98 %

## 2019-03-06 VITALS
WEIGHT: 150 LBS | HEIGHT: 65 IN | SYSTOLIC BLOOD PRESSURE: 118 MMHG | DIASTOLIC BLOOD PRESSURE: 70 MMHG | BODY MASS INDEX: 24.99 KG/M2

## 2019-03-06 VITALS
WEIGHT: 150 LBS | HEIGHT: 65 IN | DIASTOLIC BLOOD PRESSURE: 70 MMHG | SYSTOLIC BLOOD PRESSURE: 126 MMHG | BODY MASS INDEX: 24.99 KG/M2

## 2019-03-06 VITALS — HEIGHT: 65 IN | WEIGHT: 145 LBS | BODY MASS INDEX: 24.16 KG/M2

## 2019-03-22 ENCOUNTER — APPOINTMENT (OUTPATIENT)
Dept: CARDIOLOGY | Age: 34
End: 2019-03-22

## 2019-04-16 ENCOUNTER — E-ADVICE (OUTPATIENT)
Dept: OBGYN | Age: 34
End: 2019-04-16

## 2019-04-19 ENCOUNTER — E-ADVICE (OUTPATIENT)
Dept: OBGYN | Age: 34
End: 2019-04-19

## 2019-06-14 ENCOUNTER — OFFICE VISIT (OUTPATIENT)
Dept: OBGYN | Age: 34
End: 2019-06-14

## 2019-06-14 ENCOUNTER — TELEPHONE (OUTPATIENT)
Dept: OBGYN | Age: 34
End: 2019-06-14

## 2019-06-14 VITALS
DIASTOLIC BLOOD PRESSURE: 62 MMHG | HEIGHT: 65 IN | SYSTOLIC BLOOD PRESSURE: 128 MMHG | BODY MASS INDEX: 25.62 KG/M2 | WEIGHT: 153.8 LBS

## 2019-06-14 DIAGNOSIS — N92.6 IRREGULAR MENSTRUAL CYCLE: Primary | ICD-10-CM

## 2019-06-14 DIAGNOSIS — Z31.9 INFERTILITY MANAGEMENT: ICD-10-CM

## 2019-06-14 PROCEDURE — 99213 OFFICE O/P EST LOW 20 MIN: CPT | Performed by: OBSTETRICS & GYNECOLOGY

## 2019-06-23 ENCOUNTER — E-ADVICE (OUTPATIENT)
Dept: OBGYN | Age: 34
End: 2019-06-23

## 2019-06-23 DIAGNOSIS — N97.9 FEMALE INFERTILITY: Primary | ICD-10-CM

## 2019-06-27 ENCOUNTER — E-ADVICE (OUTPATIENT)
Dept: OBGYN | Age: 34
End: 2019-06-27

## 2019-07-15 ENCOUNTER — E-ADVICE (OUTPATIENT)
Dept: DERMATOLOGY | Age: 34
End: 2019-07-15

## 2019-07-22 ENCOUNTER — OFFICE VISIT (OUTPATIENT)
Dept: DERMATOLOGY | Age: 34
End: 2019-07-22

## 2019-07-22 DIAGNOSIS — L30.9 DERMATITIS, UNSPECIFIED: Primary | ICD-10-CM

## 2019-07-22 PROCEDURE — 99214 OFFICE O/P EST MOD 30 MIN: CPT | Performed by: PHYSICIAN ASSISTANT

## 2019-09-19 ENCOUNTER — OFFICE VISIT (OUTPATIENT)
Dept: DERMATOLOGY | Age: 34
End: 2019-09-19

## 2019-09-19 DIAGNOSIS — D48.9 NEOPLASM OF UNCERTAIN BEHAVIOR: Primary | ICD-10-CM

## 2019-09-19 PROCEDURE — 11102 TANGNTL BX SKIN SINGLE LES: CPT | Performed by: PHYSICIAN ASSISTANT

## 2019-09-19 PROCEDURE — 99213 OFFICE O/P EST LOW 20 MIN: CPT | Performed by: PHYSICIAN ASSISTANT

## 2019-09-19 RX ORDER — CHOLECALCIFEROL (VITAMIN D3) 25 MCG
1 TABLET,CHEWABLE ORAL
COMMUNITY

## 2019-09-19 RX ORDER — DESOXIMETASONE 2.5 MG/G
OINTMENT TOPICAL
COMMUNITY
Start: 2019-01-08 | End: 2020-03-30 | Stop reason: SDUPTHER

## 2019-09-23 ENCOUNTER — APPOINTMENT (OUTPATIENT)
Dept: DERMATOLOGY | Age: 34
End: 2019-09-23

## 2019-09-24 LAB — PATH REPORT PLASRBC-IMP: NORMAL

## 2019-12-19 ENCOUNTER — HOSPITAL ENCOUNTER (OUTPATIENT)
Age: 34
Discharge: HOME | End: 2019-12-19
Payer: COMMERCIAL

## 2019-12-19 VITALS
SYSTOLIC BLOOD PRESSURE: 132 MMHG | BODY MASS INDEX: 27 KG/M2 | WEIGHT: 160 LBS | HEART RATE: 74 BPM | OXYGEN SATURATION: 98 % | TEMPERATURE: 98 F | DIASTOLIC BLOOD PRESSURE: 76 MMHG | RESPIRATION RATE: 16 BRPM

## 2019-12-19 DIAGNOSIS — J01.00 ACUTE NON-RECURRENT MAXILLARY SINUSITIS: Primary | ICD-10-CM

## 2019-12-19 PROCEDURE — 99213 OFFICE O/P EST LOW 20 MIN: CPT

## 2019-12-19 PROCEDURE — 99214 OFFICE O/P EST MOD 30 MIN: CPT

## 2019-12-19 RX ORDER — AMOXICILLIN AND CLAVULANATE POTASSIUM 875; 125 MG/1; MG/1
1 TABLET, FILM COATED ORAL 2 TIMES DAILY
Qty: 20 TABLET | Refills: 0 | Status: SHIPPED | OUTPATIENT
Start: 2019-12-19 | End: 2019-12-29

## 2019-12-19 NOTE — ED PROVIDER NOTES
Patient Seen in: [id number] [facility]      History   Patient presents with:  Sinus Problem  Ear Problem Pain    Stated Complaint: sinus pain, ear pain    30-year-old female presents today with complaints of increased sinus pressure mostly to t 74   Resp 16   Temp 98.4 °F (36.9 °C)   Temp src    SpO2 98 %   O2 Device None (Room air)       Current:/76   Pulse 74   Temp 98.4 °F (36.9 °C)   Resp 16   Wt 72.6 kg   LMP 01/07/2019 (Exact Date)   SpO2 98%   BMI 26.63 kg/m²         Physical Exam  V improved. Patient does understand agree with plan of care.               Disposition and Plan     Clinical Impression:  Acute non-recurrent maxillary sinusitis  (primary encounter diagnosis)    Disposition:  Discharge  12/19/2019  5:37 pm    Follow-up:  PRASANNA

## 2020-02-14 ENCOUNTER — E-ADVICE (OUTPATIENT)
Dept: DERMATOLOGY | Age: 35
End: 2020-02-14

## 2020-03-28 ENCOUNTER — E-ADVICE (OUTPATIENT)
Dept: DERMATOLOGY | Age: 35
End: 2020-03-28

## 2020-03-30 ENCOUNTER — V-VISIT (OUTPATIENT)
Dept: DERMATOLOGY | Age: 35
End: 2020-03-30

## 2020-03-30 DIAGNOSIS — L30.9 DERMATITIS, UNSPECIFIED: Primary | ICD-10-CM

## 2020-03-30 PROCEDURE — 99213 OFFICE O/P EST LOW 20 MIN: CPT | Performed by: PHYSICIAN ASSISTANT

## 2020-03-30 RX ORDER — DESOXIMETASONE 2.5 MG/G
OINTMENT TOPICAL
Qty: 15 G | Refills: 1 | Status: SHIPPED | OUTPATIENT
Start: 2020-03-30 | End: 2021-08-26

## 2020-04-08 ENCOUNTER — E-ADVICE (OUTPATIENT)
Dept: DERMATOLOGY | Age: 35
End: 2020-04-08

## 2020-04-08 RX ORDER — PREDNISONE 10 MG/1
TABLET ORAL
Qty: 30 TABLET | Refills: 0 | Status: SHIPPED | OUTPATIENT
Start: 2020-04-08 | End: 2021-02-07 | Stop reason: ALTCHOICE

## 2020-04-13 ENCOUNTER — E-ADVICE (OUTPATIENT)
Dept: DERMATOLOGY | Age: 35
End: 2020-04-13

## 2020-07-16 ENCOUNTER — TELEPHONE (OUTPATIENT)
Dept: SCHEDULING | Age: 35
End: 2020-07-16

## 2020-07-16 ENCOUNTER — E-VISIT (OUTPATIENT)
Dept: URGENT CARE | Age: 35
End: 2020-07-16

## 2020-07-16 DIAGNOSIS — R69 DIAGNOSIS DEFERRED: Primary | ICD-10-CM

## 2020-07-16 ASSESSMENT — LIFESTYLE VARIABLES
SMOKING_STATUS: NO
EVER_SMOKED: I HAVE NEVER SMOKED

## 2020-12-06 ENCOUNTER — HOSPITAL ENCOUNTER (OUTPATIENT)
Age: 35
Discharge: HOME | End: 2020-12-06
Payer: COMMERCIAL

## 2020-12-06 VITALS
OXYGEN SATURATION: 99 % | DIASTOLIC BLOOD PRESSURE: 83 MMHG | RESPIRATION RATE: 18 BRPM | HEART RATE: 77 BPM | SYSTOLIC BLOOD PRESSURE: 146 MMHG | TEMPERATURE: 98 F

## 2020-12-06 DIAGNOSIS — J06.9 VIRAL URI: Primary | ICD-10-CM

## 2020-12-06 PROCEDURE — 99202 OFFICE O/P NEW SF 15 MIN: CPT | Performed by: PHYSICIAN ASSISTANT

## 2020-12-06 RX ORDER — SERTRALINE HYDROCHLORIDE 100 MG/1
100 TABLET, FILM COATED ORAL DAILY
COMMUNITY

## 2020-12-06 NOTE — ED PROVIDER NOTES
Patient Seen in: [facility] [place]      History   Patient presents with:  Headache  Runny Nose  Cough    Stated Complaint: covid testing- runny nose, headache, cough    HPI    CHIEF COMPLAINT: Runny nose, headache and cough, wants Covid testing     H Never Used    Alcohol use: Yes      Frequency: Monthly or less    Drug use: No             Review of Systems    Positive for stated complaint: covid testing- runny nose, headache, cough  Other systems are as noted in HPI.   Constitutional and vital signs re any new, changing or worsening symptoms return for reevaluation or go to the ER. Patient voiced understanding to the treatment plan. All questions answered.                           Disposition and Plan     Clinical Impression:  Viral URI  (primary encou

## 2021-02-07 ENCOUNTER — E-VISIT (OUTPATIENT)
Dept: OTHER | Age: 36
End: 2021-02-07

## 2021-02-07 DIAGNOSIS — M54.50 ACUTE MIDLINE LOW BACK PAIN WITHOUT SCIATICA: Primary | ICD-10-CM

## 2021-02-07 PROCEDURE — 99421 OL DIG E/M SVC 5-10 MIN: CPT | Performed by: NURSE PRACTITIONER

## 2021-02-07 RX ORDER — MELOXICAM 15 MG/1
15 TABLET ORAL DAILY
Qty: 7 TABLET | Refills: 0 | Status: SHIPPED | OUTPATIENT
Start: 2021-02-07 | End: 2021-06-20 | Stop reason: ALTCHOICE

## 2021-06-20 ENCOUNTER — E-VISIT (OUTPATIENT)
Dept: OTHER | Age: 36
End: 2021-06-20

## 2021-06-20 DIAGNOSIS — M54.50 ACUTE LOW BACK PAIN WITHOUT SCIATICA, UNSPECIFIED BACK PAIN LATERALITY: Primary | ICD-10-CM

## 2021-06-20 PROCEDURE — 99421 OL DIG E/M SVC 5-10 MIN: CPT | Performed by: NURSE PRACTITIONER

## 2021-06-20 RX ORDER — MELOXICAM 15 MG/1
15 TABLET ORAL DAILY
Qty: 7 TABLET | Refills: 0 | Status: SHIPPED | OUTPATIENT
Start: 2021-06-20 | End: 2021-06-27

## 2021-06-20 RX ORDER — TIZANIDINE 4 MG/1
4 TABLET ORAL EVERY 8 HOURS PRN
Qty: 20 TABLET | Refills: 0 | Status: SHIPPED | OUTPATIENT
Start: 2021-06-20 | End: 2021-06-27

## 2021-08-26 DIAGNOSIS — L30.9 DERMATITIS, UNSPECIFIED: ICD-10-CM

## 2021-08-26 RX ORDER — DESOXIMETASONE 2.5 MG/G
OINTMENT TOPICAL
Qty: 15 G | Refills: 1 | Status: SHIPPED | OUTPATIENT
Start: 2021-08-26

## 2021-12-30 ENCOUNTER — V-VISIT (OUTPATIENT)
Dept: FAMILY MEDICINE | Age: 36
End: 2021-12-30

## 2021-12-30 ENCOUNTER — NURSE TRIAGE (OUTPATIENT)
Dept: TELEHEALTH | Age: 36
End: 2021-12-30

## 2021-12-30 DIAGNOSIS — R69 DIAGNOSIS DEFERRED: Primary | ICD-10-CM

## 2022-10-22 DIAGNOSIS — L30.9 DERMATITIS, UNSPECIFIED: ICD-10-CM

## 2022-10-22 RX ORDER — DESOXIMETASONE 2.5 MG/G
OINTMENT TOPICAL
Qty: 15 G | Refills: 1 | Status: CANCELLED | OUTPATIENT
Start: 2022-10-22

## 2022-11-18 ENCOUNTER — OFFICE VISIT (OUTPATIENT)
Dept: FAMILY MEDICINE CLINIC | Facility: CLINIC | Age: 37
End: 2022-11-18
Payer: COMMERCIAL

## 2022-11-18 VITALS
OXYGEN SATURATION: 96 % | DIASTOLIC BLOOD PRESSURE: 88 MMHG | WEIGHT: 150 LBS | BODY MASS INDEX: 24.99 KG/M2 | HEIGHT: 65 IN | HEART RATE: 90 BPM | SYSTOLIC BLOOD PRESSURE: 139 MMHG | RESPIRATION RATE: 18 BRPM | TEMPERATURE: 98 F

## 2022-11-18 DIAGNOSIS — J06.9 VIRAL URI: ICD-10-CM

## 2022-11-18 DIAGNOSIS — H65.191 OTHER NON-RECURRENT ACUTE NONSUPPURATIVE OTITIS MEDIA OF RIGHT EAR: Primary | ICD-10-CM

## 2022-11-18 PROCEDURE — 3079F DIAST BP 80-89 MM HG: CPT | Performed by: NURSE PRACTITIONER

## 2022-11-18 PROCEDURE — 3075F SYST BP GE 130 - 139MM HG: CPT | Performed by: NURSE PRACTITIONER

## 2022-11-18 PROCEDURE — 99213 OFFICE O/P EST LOW 20 MIN: CPT | Performed by: NURSE PRACTITIONER

## 2022-11-18 PROCEDURE — 3008F BODY MASS INDEX DOCD: CPT | Performed by: NURSE PRACTITIONER

## 2022-11-18 RX ORDER — AMOXICILLIN 875 MG/1
875 TABLET, COATED ORAL 2 TIMES DAILY
Qty: 20 TABLET | Refills: 0 | Status: SHIPPED | OUTPATIENT
Start: 2022-11-18 | End: 2022-11-28

## 2022-12-06 ENCOUNTER — OFFICE VISIT (OUTPATIENT)
Dept: FAMILY MEDICINE CLINIC | Facility: CLINIC | Age: 37
End: 2022-12-06
Payer: COMMERCIAL

## 2022-12-06 VITALS — OXYGEN SATURATION: 97 % | RESPIRATION RATE: 12 BRPM | HEART RATE: 103 BPM | TEMPERATURE: 97 F

## 2022-12-06 DIAGNOSIS — R68.89 FLU-LIKE SYMPTOMS: Primary | ICD-10-CM

## 2022-12-06 PROCEDURE — 99213 OFFICE O/P EST LOW 20 MIN: CPT | Performed by: FAMILY MEDICINE

## 2022-12-06 PROCEDURE — 87637 SARSCOV2&INF A&B&RSV AMP PRB: CPT | Performed by: FAMILY MEDICINE

## 2022-12-06 RX ORDER — OSELTAMIVIR PHOSPHATE 75 MG/1
75 CAPSULE ORAL 2 TIMES DAILY
Qty: 10 CAPSULE | Refills: 0 | Status: SHIPPED | OUTPATIENT
Start: 2022-12-06 | End: 2022-12-11

## 2022-12-07 LAB
FLUAV + FLUBV RNA SPEC NAA+PROBE: DETECTED
FLUAV + FLUBV RNA SPEC NAA+PROBE: NOT DETECTED
RSV RNA SPEC NAA+PROBE: NOT DETECTED
SARS-COV-2 RNA RESP QL NAA+PROBE: NOT DETECTED

## 2022-12-25 ENCOUNTER — E-VISIT (OUTPATIENT)
Dept: TELEHEALTH | Age: 37
End: 2022-12-25

## 2022-12-25 DIAGNOSIS — J01.90 ACUTE SINUSITIS, RECURRENCE NOT SPECIFIED, UNSPECIFIED LOCATION: Primary | ICD-10-CM

## 2022-12-25 PROCEDURE — 99421 OL DIG E/M SVC 5-10 MIN: CPT | Performed by: PHYSICIAN ASSISTANT

## 2022-12-26 RX ORDER — AMOXICILLIN AND CLAVULANATE POTASSIUM 875; 125 MG/1; MG/1
1 TABLET, FILM COATED ORAL 2 TIMES DAILY
Qty: 14 TABLET | Refills: 0 | Status: SHIPPED | OUTPATIENT
Start: 2022-12-26 | End: 2023-01-02

## 2023-01-04 ENCOUNTER — OFFICE VISIT (OUTPATIENT)
Dept: FAMILY MEDICINE CLINIC | Facility: CLINIC | Age: 38
End: 2023-01-04
Payer: COMMERCIAL

## 2023-01-04 VITALS
OXYGEN SATURATION: 99 % | TEMPERATURE: 98 F | BODY MASS INDEX: 24.99 KG/M2 | DIASTOLIC BLOOD PRESSURE: 78 MMHG | HEART RATE: 72 BPM | RESPIRATION RATE: 20 BRPM | WEIGHT: 150 LBS | SYSTOLIC BLOOD PRESSURE: 124 MMHG | HEIGHT: 65 IN

## 2023-01-04 DIAGNOSIS — J40 BRONCHITIS: ICD-10-CM

## 2023-01-04 DIAGNOSIS — J01.40 ACUTE NON-RECURRENT PANSINUSITIS: Primary | ICD-10-CM

## 2023-01-04 PROCEDURE — 3078F DIAST BP <80 MM HG: CPT | Performed by: NURSE PRACTITIONER

## 2023-01-04 PROCEDURE — 99213 OFFICE O/P EST LOW 20 MIN: CPT | Performed by: NURSE PRACTITIONER

## 2023-01-04 PROCEDURE — 3074F SYST BP LT 130 MM HG: CPT | Performed by: NURSE PRACTITIONER

## 2023-01-04 PROCEDURE — 3008F BODY MASS INDEX DOCD: CPT | Performed by: NURSE PRACTITIONER

## 2023-01-04 RX ORDER — CEFDINIR 300 MG/1
300 CAPSULE ORAL 2 TIMES DAILY
Qty: 20 CAPSULE | Refills: 0 | Status: SHIPPED | OUTPATIENT
Start: 2023-01-04 | End: 2023-01-14

## 2023-01-04 RX ORDER — FLUTICASONE PROPIONATE 50 MCG
2 SPRAY, SUSPENSION (ML) NASAL DAILY
Qty: 1 EACH | Refills: 0 | Status: SHIPPED | OUTPATIENT
Start: 2023-01-04 | End: 2023-02-03

## 2023-01-04 RX ORDER — ALBUTEROL SULFATE 90 UG/1
2 AEROSOL, METERED RESPIRATORY (INHALATION) EVERY 4 HOURS PRN
Qty: 1 EACH | Refills: 0 | Status: SHIPPED | OUTPATIENT
Start: 2023-01-04

## 2023-09-21 ENCOUNTER — OFFICE VISIT (OUTPATIENT)
Dept: FAMILY MEDICINE CLINIC | Facility: CLINIC | Age: 38
End: 2023-09-21
Payer: COMMERCIAL

## 2023-09-21 VITALS
BODY MASS INDEX: 24.99 KG/M2 | HEIGHT: 65 IN | SYSTOLIC BLOOD PRESSURE: 112 MMHG | WEIGHT: 150 LBS | HEART RATE: 81 BPM | TEMPERATURE: 98 F | DIASTOLIC BLOOD PRESSURE: 60 MMHG | OXYGEN SATURATION: 98 % | RESPIRATION RATE: 18 BRPM

## 2023-09-21 DIAGNOSIS — J02.9 SORE THROAT: Primary | ICD-10-CM

## 2023-09-21 DIAGNOSIS — J06.9 VIRAL URI: ICD-10-CM

## 2023-09-21 LAB
CONTROL LINE PRESENT WITH A CLEAR BACKGROUND (YES/NO): YES YES/NO
KIT LOT #: NORMAL NUMERIC
STREP GRP A CUL-SCR: NEGATIVE

## 2023-09-21 PROCEDURE — 87637 SARSCOV2&INF A&B&RSV AMP PRB: CPT | Performed by: NURSE PRACTITIONER

## 2023-09-21 PROCEDURE — 87880 STREP A ASSAY W/OPTIC: CPT | Performed by: NURSE PRACTITIONER

## 2023-09-21 PROCEDURE — 99213 OFFICE O/P EST LOW 20 MIN: CPT | Performed by: NURSE PRACTITIONER

## 2023-09-21 PROCEDURE — 3078F DIAST BP <80 MM HG: CPT | Performed by: NURSE PRACTITIONER

## 2023-09-21 PROCEDURE — 3074F SYST BP LT 130 MM HG: CPT | Performed by: NURSE PRACTITIONER

## 2023-09-21 PROCEDURE — 87081 CULTURE SCREEN ONLY: CPT | Performed by: NURSE PRACTITIONER

## 2023-09-21 PROCEDURE — 3008F BODY MASS INDEX DOCD: CPT | Performed by: NURSE PRACTITIONER

## 2023-09-22 LAB
FLUAV + FLUBV RNA SPEC NAA+PROBE: NOT DETECTED
FLUAV + FLUBV RNA SPEC NAA+PROBE: NOT DETECTED
RSV RNA SPEC NAA+PROBE: NOT DETECTED
SARS-COV-2 RNA RESP QL NAA+PROBE: NOT DETECTED

## 2023-10-27 ENCOUNTER — E-VISIT (OUTPATIENT)
Dept: TELEHEALTH | Age: 38
End: 2023-10-27

## 2023-10-27 DIAGNOSIS — J01.90 ACUTE NON-RECURRENT SINUSITIS, UNSPECIFIED LOCATION: Primary | ICD-10-CM

## 2023-10-27 DIAGNOSIS — H92.09 OTALGIA, UNSPECIFIED LATERALITY: ICD-10-CM

## 2023-10-27 RX ORDER — AMOXICILLIN AND CLAVULANATE POTASSIUM 875; 125 MG/1; MG/1
1 TABLET, FILM COATED ORAL 2 TIMES DAILY
Qty: 20 TABLET | Refills: 0 | Status: SHIPPED
Start: 2023-10-27 | End: 2023-11-06

## 2023-10-27 NOTE — PROGRESS NOTES
Chelsie Selby is a 40year old female. HPI:   See answers to questions above. Current Outpatient Medications   Medication Sig Dispense Refill    amoxicillin clavulanate 875-125 MG Oral Tab Take 1 tablet by mouth 2 (two) times daily for 10 days. 20 tablet 0    Sertraline HCl 100 MG Oral Tab Take 100 mg by mouth daily. prenatal multivitamin plus DHA 27-0.8-228 MG Oral Cap Take 1 capsule by mouth daily. Past Medical History:   Diagnosis Date    Cardiomyopathy [facility])     Oct 2017    Kidney infection     MRSA infection     Oct 2017 Hospitalized for 1 week    Psoriasis     Septic shock [facility])     Oct 2017. Hospitalized for 1 week for MRSA mastitis. 5 weeks post partum. Past Surgical History:   Procedure Laterality Date    OTHER SURGICAL HISTORY      tubes both ears. TRANSNASAL EUSTACH TUBE INFLATE        Family History   Problem Relation Age of Onset    Heart Disease Maternal Grandfather     Stroke Paternal Grandfather       Social History:  Social History     Socioeconomic History    Marital status:    Tobacco Use    Smoking status: Never    Smokeless tobacco: Never   Vaping Use    Vaping Use: Never used   Substance and Sexual Activity    Alcohol use: Yes    Drug use: No    Sexual activity: Yes     Partners: Male   Other Topics Concern    Caffeine Concern Yes    Exercise Yes    Seat Belt Yes   Social History Narrative    ** Merged History Encounter **              ASSESSMENT AND PLAN:     Acute non-recurrent sinusitis, unspecified location  (primary encounter diagnosis)  Otalgia, unspecified laterality        Meds & Refills for this Visit:  Requested Prescriptions     Signed Prescriptions Disp Refills    amoxicillin clavulanate 875-125 MG Oral Tab 20 tablet 0     Sig: Take 1 tablet by mouth 2 (two) times daily for 10 days.        E-Visit Times and Charging  Reviewing Chart & QNR: 4 minutes  Plan: 4 minutes  Note: 2 minutes  My total time spent caring for the patient for E-Visit: 10 minutes - [id number]  Charges: [id number]         Patient advised to follow up with PCP if no improvement or worsening of symptoms  Refer to [employer] [employer] message for specific patient instructions

## 2024-01-18 ENCOUNTER — OFFICE VISIT (OUTPATIENT)
Dept: FAMILY MEDICINE CLINIC | Facility: CLINIC | Age: 39
End: 2024-01-18
Payer: COMMERCIAL

## 2024-01-18 VITALS
RESPIRATION RATE: 18 BRPM | HEART RATE: 76 BPM | SYSTOLIC BLOOD PRESSURE: 126 MMHG | WEIGHT: 150 LBS | BODY MASS INDEX: 24.99 KG/M2 | OXYGEN SATURATION: 99 % | HEIGHT: 65 IN | DIASTOLIC BLOOD PRESSURE: 80 MMHG | TEMPERATURE: 98 F

## 2024-01-18 DIAGNOSIS — J01.10 ACUTE NON-RECURRENT FRONTAL SINUSITIS: Primary | ICD-10-CM

## 2024-01-18 DIAGNOSIS — R05.1 ACUTE COUGH: ICD-10-CM

## 2024-01-18 PROCEDURE — 3074F SYST BP LT 130 MM HG: CPT | Performed by: NURSE PRACTITIONER

## 2024-01-18 PROCEDURE — 3008F BODY MASS INDEX DOCD: CPT | Performed by: NURSE PRACTITIONER

## 2024-01-18 PROCEDURE — 3079F DIAST BP 80-89 MM HG: CPT | Performed by: NURSE PRACTITIONER

## 2024-01-18 PROCEDURE — 99213 OFFICE O/P EST LOW 20 MIN: CPT | Performed by: NURSE PRACTITIONER

## 2024-01-18 RX ORDER — BENZONATATE 200 MG/1
200 CAPSULE ORAL 3 TIMES DAILY PRN
Qty: 30 CAPSULE | Refills: 0 | Status: SHIPPED | OUTPATIENT
Start: 2024-01-18

## 2024-01-18 RX ORDER — AMOXICILLIN AND CLAVULANATE POTASSIUM 875; 125 MG/1; MG/1
1 TABLET, FILM COATED ORAL 2 TIMES DAILY
Qty: 20 TABLET | Refills: 0 | Status: SHIPPED | OUTPATIENT
Start: 2024-01-18 | End: 2024-01-28

## 2024-01-18 NOTE — PROGRESS NOTES
CHIEF COMPLAINT:     Chief Complaint   Patient presents with    Cough     Started 3-4 weeks ago    No fever        HPI:   Katey Morrell is a 38 year old female who presents for sinus congestion, post nasal drip, and cough. Symptoms started 4 weeks ago. Denies wheezing, fever, swelling in legs or difficulty breathing. Admits fatigue, sinus pressure.  Treating symptoms with delsym, zyrtec, mucinex, sudafed.      Current Outpatient Medications   Medication Sig Dispense Refill    Sertraline HCl 100 MG Oral Tab Take 100 mg by mouth daily.      prenatal multivitamin plus DHA 27-0.8-228 MG Oral Cap Take 1 capsule by mouth daily.        Past Medical History:   Diagnosis Date    Cardiomyopathy ([facility])     Oct 2017    Kidney infection     MRSA infection     Oct 2017 Hospitalized for 1 week    Psoriasis     Septic shock ([facility])     Oct 2017. Hospitalized for 1 week for MRSA mastitis. 5 weeks post partum.      Past Surgical History:   Procedure Laterality Date    OTHER SURGICAL HISTORY      tubes both ears.     TRANSNASAL EUSTACH TUBE INFLATE           Social History     Socioeconomic History    Marital status:    Tobacco Use    Smoking status: Never    Smokeless tobacco: Never   Vaping Use    Vaping Use: Never used   Substance and Sexual Activity    Alcohol use: Yes    Drug use: No    Sexual activity: Yes     Partners: Male   Other Topics Concern    Caffeine Concern Yes    Exercise Yes    Seat Belt Yes   Social History Narrative    ** Merged History Encounter **              REVIEW OF SYSTEMS:   GENERAL: normal appetite. + fatigue  SKIN: no rashes or abnormal skin lesions  HEENT: See HPI  LUNGS: denies shortness of breath or wheezing, See HPI  CARDIOVASCULAR: denies chest pain or palpitations   GI: denies N/V/C or abdominal pain  NEURO: Denies dizziness or weakness    EXAM:   /80   Pulse 76   Temp 97.6 °F (36.4 °C)   Resp 18   Ht 5' 5\" (1.651 m)   Wt 150 lb (68 kg)   LMP 01/08/2024 (Approximate)   SpO2 99%    BMI 24.96 kg/m²   GENERAL: well developed, well nourished,in no apparent distress  SKIN: no rashes,no suspicious lesions  HEAD: atraumatic, normocephalic.    EYES: conjunctiva clear, EOM intact  EARS: TM's gray, no bulging, no retraction,+ fluid, bony landmarks visible  NOSE: Nostrils patent, + thick nasal discharge, nasal mucosa inflamed   THROAT: Oral mucosa pink, moist. Posterior pharynx is non erythematous. no exudates. Tonsils 1/4.    NECK: Supple, non-tender  LUNGS: clear to auscultation bilaterally, no wheezes or rhonchi. Breathing is non labored.  CARDIO: RRR without murmur  EXTREMITIES: no cyanosis, clubbing or edema  LYMPH:  no lymphadenopathy.        ASSESSMENT AND PLAN:   Katey Morrell is a 38 year old female who presents with upper respiratory symptoms that are consistent with    ASSESSMENT:   Encounter Diagnoses   Name Primary?    Acute non-recurrent frontal sinusitis Yes    Acute cough          PLAN:   Medication as below  Comfort care per pt instructions.   To follow up for any new or worsening symptoms or if not improving the next few days.   To go to the ER for any severe symptoms such as difficulty breathing or chest pain.     Meds & Refills for this Visit:  Requested Prescriptions     Signed Prescriptions Disp Refills    amoxicillin clavulanate 875-125 MG Oral Tab 20 tablet 0     Sig: Take 1 tablet by mouth 2 (two) times daily for 10 days.    benzonatate 200 MG Oral Cap 30 capsule 0     Sig: Take 1 capsule (200 mg total) by mouth 3 (three) times daily as needed for cough.       Risks, benefits, and side effects of medication explained and discussed.    Patient Instructions   Flonase, antihistamine, plain mucinex, humidifier  Medication as prescribed.   Follow up for any new or worsening symptoms or if symptoms are not improving over the next few days.    Schedule an appointment to establish care with a primary care provider. Refer to the pamphlet you were given in clinic for the [facility]  primary care locations.     The patient indicates understanding of these issues and agrees to the plan.  The patient is asked to return if sx's persist or worsen.

## 2024-01-18 NOTE — PATIENT INSTRUCTIONS
Flonase, antihistamine, plain mucinex, humidifier  Medication as prescribed.   Follow up for any new or worsening symptoms or if symptoms are not improving over the next few days.    Schedule an appointment to establish care with a primary care provider. Refer to the pamphlet you were given in clinic for the [facility] primary care locations. I recommend you follow up with primary care in the next 1-2 weeks.

## 2024-03-10 ENCOUNTER — HOSPITAL ENCOUNTER (OUTPATIENT)
Age: 39
Discharge: HOME | End: 2024-03-10
Payer: COMMERCIAL

## 2024-03-10 VITALS
BODY MASS INDEX: 23.82 KG/M2 | RESPIRATION RATE: 16 BRPM | OXYGEN SATURATION: 100 % | HEART RATE: 104 BPM | TEMPERATURE: 99 F | HEIGHT: 65 IN | SYSTOLIC BLOOD PRESSURE: 134 MMHG | WEIGHT: 143 LBS | DIASTOLIC BLOOD PRESSURE: 92 MMHG

## 2024-03-10 DIAGNOSIS — H66.90 ACUTE OTITIS MEDIA, UNSPECIFIED OTITIS MEDIA TYPE: Primary | ICD-10-CM

## 2024-03-10 RX ORDER — AMOXICILLIN 875 MG/1
875 TABLET, COATED ORAL 2 TIMES DAILY
Qty: 14 TABLET | Refills: 0 | Status: SHIPPED | OUTPATIENT
Start: 2024-03-10 | End: 2024-03-17

## 2024-03-10 RX ORDER — NORETHINDRONE ACETATE AND ETHINYL ESTRADIOL AND FERROUS FUMARATE 1MG-20(24)
1 KIT ORAL DAILY
COMMUNITY
Start: 2023-11-21

## 2024-03-10 NOTE — ED INITIAL ASSESSMENT (HPI)
Pt with c/o ear pain that started yesterday.  Pt states worse last night.  Pt with decreased hearing this morning

## 2024-03-10 NOTE — ED PROVIDER NOTES
Patient Seen in: [facility] [place]      History     Chief Complaint   Patient presents with    Ear Problem     I had ear pain all night and then I believe my ear drum ruptured. - Entered by patient     Stated Complaint: Ear Problem - I had ear pain all night and then I believe my ear drum ruptured.    Subjective:   38-year-old female presents to complaints of increased pain to the right ear that started yesterday worsening last night.  Has had some mild URI symptoms and sore throat.  Did notice some drainage from the ear last night as well.  Denies any dizziness headaches no nausea vomiting.  Alert oriented x 3.  No other symptoms or concerns.  The patient's medication list, past medical history and social history elements as listed in today's nurse's notes were reviewed and agreed (except as otherwise stated in the HPI).  The patient's family history reviewed and determined to be noncontributory to the presenting problem            Objective:   Past Medical History:   Diagnosis Date    Cardiomyopathy ([facility])     Oct 2017    Kidney infection     MRSA infection     Oct 2017 Hospitalized for 1 week    Psoriasis     Septic shock ([facility])     Oct 2017. Hospitalized for 1 week for MRSA mastitis. 5 weeks post partum.              Past Surgical History:   Procedure Laterality Date    OTHER SURGICAL HISTORY      tubes both ears.     TRANSNASAL EUSTACH TUBE INFLATE                  Social History     Socioeconomic History    Marital status:    Tobacco Use    Smoking status: Never    Smokeless tobacco: Never   Vaping Use    Vaping Use: Never used   Substance and Sexual Activity    Alcohol use: Yes    Drug use: No    Sexual activity: Yes     Partners: Male   Other Topics Concern    Caffeine Concern Yes    Exercise Yes    Seat Belt Yes   Social History Narrative    ** Merged History Encounter **                   Review of Systems    Positive for stated complaint: Ear Problem - I had ear pain all night and then I  believe my ear drum ruptured.  Other systems are as noted in HPI.  Constitutional and vital signs reviewed.      All other systems reviewed and negative except as noted above.    Physical Exam     ED Triage Vitals [03/10/24 0902]   BP (!) 134/92   Pulse 104   Resp 16   Temp 98.6 °F (37 °C)   Temp src Temporal   SpO2 100 %   O2 Device None (Room air)       Current:BP (!) 134/92   Pulse 104   Temp 98.6 °F (37 °C) (Temporal)   Resp 16   Ht 165.1 cm (5' 5\")   Wt 64.9 kg   LMP 01/08/2024 (Approximate)   SpO2 100%   BMI 23.80 kg/m²         Physical Exam  Vitals and nursing note reviewed.   Constitutional:       Appearance: Normal appearance.   HENT:      Head: Normocephalic.      Right Ear: Tympanic membrane is erythematous.      Left Ear: Tympanic membrane is injected.      Nose: Mucosal edema and rhinorrhea present.      Mouth/Throat:      Lips: Pink.      Mouth: Mucous membranes are moist.      Pharynx: Posterior oropharyngeal erythema present.   Cardiovascular:      Rate and Rhythm: Normal rate.   Pulmonary:      Effort: Pulmonary effort is normal.   Skin:     General: Skin is warm and dry.   Neurological:      Mental Status: She is alert. She is disoriented.               ED Course   Labs Reviewed - No data to display                   MDM     Please note that this report has been produced using speech recognition software and may contain errors related to that system including, but not limited to, errors in grammar, punctuation, and spelling, as well as words and phrases that possibly may have been recognized inappropriately.  If there are any questions or concerns, contact the dictating provider for clarification.        Note to patient: The 21st Century Cures Act makes medical notes like these available to patients in the interest of transparency. However, this is a medical document intended as peer to peer communication. It is written in medical language and may contain abbreviations or verbiage that are  unfamiliar. It may appear blunt or direct. Medical documents are intended to carry relevant information, facts as evident, and the clinical opinion of the practitioner.                                   Medical Decision Making  Differential diagnosis includes but is not limited to: Otitis media, otitis externa, strep throat, eustachian tube dysfunction, mastoiditis, TMJ        Presented today with complaints of URI symptoms with worsening of ear pain and drainage from the ear.  On exam does have erythema to the TM bilaterally right is worse than the left.  Will treat for possible [place] media.  Will give prescription for amoxicillin to take as directed.  Encouraged to push fluids rest alternate Tylenol Motrin if fever pain.  May use over-the-counter antihistamine and Flonase.  Encouraged follow-up to primary care physician next 3 to 5 days if symptoms do not improve.  Patient verbalized understanding and agreed to plan of care.    Risk  OTC drugs.  Prescription drug management.        Disposition and Plan     Clinical Impression:  1. Acute otitis media, unspecified otitis media type         Disposition:  Discharge  3/10/2024  9:17 am    Follow-up:  PCP    In 1 week  As needed          Medications Prescribed:  Current Discharge Medication List        START taking these medications    Details   amoxicillin 875 MG Oral Tab Take 1 tablet (875 mg total) by mouth 2 (two) times daily for 7 days.  Qty: 14 tablet, Refills: 0

## 2024-03-11 ENCOUNTER — HOSPITAL ENCOUNTER (OUTPATIENT)
Age: 39
Discharge: HOME | End: 2024-03-11
Payer: COMMERCIAL

## 2024-03-11 VITALS
SYSTOLIC BLOOD PRESSURE: 126 MMHG | WEIGHT: 143 LBS | HEART RATE: 89 BPM | TEMPERATURE: 97 F | BODY MASS INDEX: 23.82 KG/M2 | RESPIRATION RATE: 16 BRPM | HEIGHT: 65 IN | DIASTOLIC BLOOD PRESSURE: 88 MMHG | OXYGEN SATURATION: 100 %

## 2024-03-11 DIAGNOSIS — R42 DIZZINESS: Primary | ICD-10-CM

## 2024-03-11 PROCEDURE — 99213 OFFICE O/P EST LOW 20 MIN: CPT | Performed by: NURSE PRACTITIONER

## 2024-03-11 RX ORDER — MECLIZINE HYDROCHLORIDE 25 MG/1
25 TABLET ORAL 3 TIMES DAILY PRN
Qty: 20 TABLET | Refills: 0 | Status: SHIPPED | OUTPATIENT
Start: 2024-03-11

## 2024-03-11 RX ORDER — DIPHENOXYLATE HYDROCHLORIDE AND ATROPINE SULFATE 2.5; .025 MG/1; MG/1
1 TABLET ORAL
COMMUNITY

## 2024-03-11 NOTE — DISCHARGE INSTRUCTIONS
Take the meclizine as prescribed.  Continue taking the amoxicillin as prescribed.  Stay well-hydrated well-nourished.  Follow-up with your doctor.  Go to the ER for new or worsening symptoms.

## 2024-03-11 NOTE — ED PROVIDER NOTES
Patient Seen in: [facility] [place]      History     Chief Complaint   Patient presents with    Ear Problem     I was in yesterday for an ear infection, but now I'm so dizzy and think I have vertigo. - Entered by patient     Stated Complaint: Ear Problem - I was in yesterday for an ear infection, but now I’m so dizzy and*    Subjective:   38-year-old female complaining of vertigo-like dizziness.  States it started yesterday.  Had ear pain that started 2 days ago.  Was seen here yesterday diagnosed with bilateral otitis media.  Given amoxicillin.  States that she feels the dizziness which she describes as a spinning sensation with position changes.            Objective:   Past Medical History:   Diagnosis Date    Cardiomyopathy ([facility])     Oct 2017    Kidney infection     MRSA infection     Oct 2017 Hospitalized for 1 week    Psoriasis     Septic shock ([facility])     Oct 2017. Hospitalized for 1 week for MRSA mastitis. 5 weeks post partum.              Past Surgical History:   Procedure Laterality Date    OTHER SURGICAL HISTORY      tubes both ears.     TRANSNASAL EUSTACH TUBE INFLATE                  Social History     Socioeconomic History    Marital status:    Tobacco Use    Smoking status: Never    Smokeless tobacco: Never   Vaping Use    Vaping Use: Never used   Substance and Sexual Activity    Alcohol use: Yes    Drug use: No    Sexual activity: Yes     Partners: Male   Other Topics Concern    Caffeine Concern Yes    Exercise Yes    Seat Belt Yes   Social History Narrative    ** Merged History Encounter **                   Review of Systems   Constitutional: Negative.    Gastrointestinal:  Negative for vomiting.   Neurological:  Positive for dizziness.   All other systems reviewed and are negative.      Positive for stated complaint: Ear Problem - I was in yesterday for an ear infection, but now I’m so dizzy and*  Other systems are as noted in HPI.  Constitutional and vital signs reviewed.      All other  systems reviewed and negative except as noted above.    Physical Exam     ED Triage Vitals [03/11/24 1204]   /88   Pulse 89   Resp 16   Temp 97.4 °F (36.3 °C)   Temp src Temporal   SpO2 100 %   O2 Device None (Room air)       Current:/88   Pulse 89   Temp 97.4 °F (36.3 °C) (Temporal)   Resp 16   Ht 165.1 cm (5' 5\")   Wt 64.9 kg   LMP 02/28/2024 (Approximate)   SpO2 100%   BMI 23.80 kg/m²         Physical Exam  Vitals and nursing note reviewed.   Constitutional:       General: She is not in acute distress.     Appearance: Normal appearance. She is not ill-appearing, toxic-appearing or diaphoretic.   HENT:      Right Ear: Ear canal and external ear normal. Tympanic membrane is erythematous.      Left Ear: Ear canal and external ear normal. Tympanic membrane is erythematous.   Eyes:      Extraocular Movements: Extraocular movements intact.      Conjunctiva/sclera: Conjunctivae normal.   Pulmonary:      Effort: No respiratory distress.   Skin:     General: Skin is warm and dry.      Coloration: Skin is not pale.   Neurological:      General: No focal deficit present.      Mental Status: She is alert.      GCS: GCS eye subscore is 4. GCS verbal subscore is 5. GCS motor subscore is 6.   Psychiatric:         Mood and Affect: Mood normal.               ED Course   Labs Reviewed - No data to display                   MDM                                         Medical Decision Making  Differential diagnosis initially included but was not limited to: Acute intracranial process, vertigo    Nontoxic 38-year-old female with vertigo-like dizziness.  Ears are not impacted.  Both ears do look infected, already being treated with antibiotics.  Reproducible dizziness with position changes, and resolved within a few seconds of visual fixation.  Consistent with vertigo.  She has no focal or neurological deficits.  No recent head injury or trauma.  Likely to have an acute intracranial process.  Did offer to give  meclizine in clinic and observed for approximately 45 minutes.  Patient declined this as she states that her  drove her here and has kids waiting the car.  States she feels comfortable being prescribed meclizine and trying to manage it at home.  She does not feel that she is dehydrated.    Supportive/home management of diagnosis/illness/injury discussed. Red flag symptoms discussed.  Signs and symptoms/criteria that would necessitate reevaluation, including ER evaluation discussed.  Patient and/or responsible adult verbalize and agree with management and plan of care.    Speech recognition software was used during this dictation.  There may be minor errors in transcription.      Risk  Prescription drug management.        Disposition and Plan     Clinical Impression:  1. Dizziness         Disposition:  Discharge  3/11/2024 12:34 pm    Follow-up:  Call your doctor in 2 days to schedule a follow-up appointment.                Medications Prescribed:  Discharge Medication List as of 3/11/2024 12:33 PM

## 2024-03-16 ENCOUNTER — E-VISIT (OUTPATIENT)
Dept: TELEHEALTH | Age: 39
End: 2024-03-16
Payer: COMMERCIAL

## 2024-03-16 DIAGNOSIS — J01.90 ACUTE RHINOSINUSITIS: Primary | ICD-10-CM

## 2024-03-16 DIAGNOSIS — H66.90 ACUTE OTITIS MEDIA, UNSPECIFIED OTITIS MEDIA TYPE: ICD-10-CM

## 2024-03-16 RX ORDER — AMOXICILLIN AND CLAVULANATE POTASSIUM 875; 125 MG/1; MG/1
1 TABLET, FILM COATED ORAL 2 TIMES DAILY WITH MEALS
Qty: 20 TABLET | Refills: 0 | Status: SHIPPED | OUTPATIENT
Start: 2024-03-16 | End: 2024-03-26

## 2024-03-16 NOTE — PROGRESS NOTES
Katey Morrell is a 38 year old female submitting e-visit for ongoing sinus and ear symptoms.  HPI:   See answers to questionnaire and [employer] message exchange  No improvement with amoxicillin prescribed at  3/10 for AOM    Current Outpatient Medications   Medication Sig Dispense Refill    Multiple Vitamin (THERA/BETA-CAROTENE) Oral Tab Take 1 tablet by mouth.      meclizine 25 MG Oral Tab Take 1 tablet (25 mg total) by mouth 3 (three) times daily as needed for Dizziness. 20 tablet 0    Norethin Ace-Eth Estrad-FE 1-20 MG-MCG(24) Oral Tab Take 1 tablet by mouth daily.      amoxicillin 875 MG Oral Tab Take 1 tablet (875 mg total) by mouth 2 (two) times daily for 7 days. 14 tablet 0      Past Medical History:   Diagnosis Date    Cardiomyopathy ([facility])     Oct 2017    Kidney infection     MRSA infection     Oct 2017 Hospitalized for 1 week    Psoriasis     Septic shock ([facility])     Oct 2017. Hospitalized for 1 week for MRSA mastitis. 5 weeks post partum.      Past Surgical History:   Procedure Laterality Date    OTHER SURGICAL HISTORY      tubes both ears.     TRANSNASAL EUSTACH TUBE INFLATE        Family History   Problem Relation Age of Onset    Heart Disease Maternal Grandfather     Stroke Paternal Grandfather       Social History:  Social History     Socioeconomic History    Marital status:    Tobacco Use    Smoking status: Never    Smokeless tobacco: Never   Vaping Use    Vaping Use: Never used   Substance and Sexual Activity    Alcohol use: Yes    Drug use: No    Sexual activity: Yes     Partners: Male   Other Topics Concern    Caffeine Concern Yes    Exercise Yes    Seat Belt Yes   Social History Narrative    ** Merged History Encounter **              ASSESSMENT AND PLAN:       Diagnoses and all orders for this visit:    Acute rhinosinusitis  -     amoxicillin clavulanate 875-125 MG Oral Tab; Take 1 tablet by mouth 2 (two) times daily with meals for 10 days. Take with food    Acute otitis media,  unspecified otitis media type  -     amoxicillin clavulanate 875-125 MG Oral Tab; Take 1 tablet by mouth 2 (two) times daily with meals for 10 days. Take with food    Stop amoxicillin; switch to amox clav  Info provided on use, dose, and possible side effects  Supportive measures  Patient advised to follow up with PCP if no improvement or worsening of symptoms  Refer to [facility] message exchange for specific patient instructions      Duration of  the service:  12 minutes

## 2024-09-05 ENCOUNTER — OFFICE VISIT (OUTPATIENT)
Dept: FAMILY MEDICINE CLINIC | Facility: CLINIC | Age: 39
End: 2024-09-05
Payer: COMMERCIAL

## 2024-09-05 VITALS
RESPIRATION RATE: 18 BRPM | BODY MASS INDEX: 23.32 KG/M2 | HEIGHT: 65 IN | DIASTOLIC BLOOD PRESSURE: 86 MMHG | OXYGEN SATURATION: 99 % | HEART RATE: 103 BPM | TEMPERATURE: 98 F | WEIGHT: 140 LBS | SYSTOLIC BLOOD PRESSURE: 128 MMHG

## 2024-09-05 DIAGNOSIS — H66.001 NON-RECURRENT ACUTE SUPPURATIVE OTITIS MEDIA OF RIGHT EAR WITHOUT SPONTANEOUS RUPTURE OF TYMPANIC MEMBRANE: Primary | ICD-10-CM

## 2024-09-05 PROCEDURE — 3079F DIAST BP 80-89 MM HG: CPT | Performed by: NURSE PRACTITIONER

## 2024-09-05 PROCEDURE — 3074F SYST BP LT 130 MM HG: CPT | Performed by: NURSE PRACTITIONER

## 2024-09-05 PROCEDURE — 99213 OFFICE O/P EST LOW 20 MIN: CPT | Performed by: NURSE PRACTITIONER

## 2024-09-05 PROCEDURE — 3008F BODY MASS INDEX DOCD: CPT | Performed by: NURSE PRACTITIONER

## 2024-09-05 RX ORDER — AMOXICILLIN 500 MG/1
500 CAPSULE ORAL 3 TIMES DAILY
Qty: 30 CAPSULE | Refills: 0 | Status: SHIPPED | OUTPATIENT
Start: 2024-09-05 | End: 2024-09-15

## 2024-09-05 NOTE — PATIENT INSTRUCTIONS
1. Rest. Drink plenty of fluids.  2. Amoxicillin as prescribed.  3. Supportive care as discussed.  4. Use humidifier at home when possible.  5. Follow up with PMD in 3-4 days for reeval. Follow up sooner or go to the emergency department immediately if symptoms worsen, change, or if you have any concerns.

## 2024-09-05 NOTE — PROGRESS NOTES
CHIEF COMPLAINT:     Chief Complaint   Patient presents with    Ear Pain     Right ear pain for two days   OTC: Motrin and sudafed        HPI:   Katey Morrell is a non-toxic, well appearing 38 year old female who presents today with complaints of right ear pain.  Notes it has been present for 2 days. Denies fevers,decreased hearing, drainage from ear or trauma to ear. Noted recent upper respiratory symptoms last week. Had negative home covid-19 testing. Denies recent swimming. Tolerates PO well at home. No n/v/d.  Denies any other aggravating or relieving factors at home. Denies any other treatment attempts prior to arrival.         Current Outpatient Medications   Medication Sig Dispense Refill    amoxicillin 500 MG Oral Cap Take 1 capsule (500 mg total) by mouth 3 (three) times daily for 10 days. 30 capsule 0    Multiple Vitamin (THERA/BETA-CAROTENE) Oral Tab Take 1 tablet by mouth.      meclizine 25 MG Oral Tab Take 1 tablet (25 mg total) by mouth 3 (three) times daily as needed for Dizziness. 20 tablet 0    Norethin Ace-Eth Estrad-FE 1-20 MG-MCG(24) Oral Tab Take 1 tablet by mouth daily.        Past Medical History:    Cardiomyopathy (HCC)    Oct 2017    Kidney infection    MRSA infection    Oct 2017 Hospitalized for 1 week    Psoriasis    Septic shock (HCC)    Oct 2017. Hospitalized for 1 week for MRSA mastitis. 5 weeks post partum.      Social History:  Social History     Socioeconomic History    Marital status:    Tobacco Use    Smoking status: Never    Smokeless tobacco: Never   Vaping Use    Vaping status: Never Used   Substance and Sexual Activity    Alcohol use: Yes    Drug use: No    Sexual activity: Yes     Partners: Male   Other Topics Concern    Caffeine Concern Yes    Exercise Yes    Seat Belt Yes   Social History Narrative    ** Merged History Encounter **          Social Determinants of Health     Financial Resource Strain: Not At Risk (5/26/2022)    Received from [facility]  [facility], [facility]    Financial Resource Strain     How hard is it for you to pay for the very basics like food, housing, medical care, and heating?: Not hard at all   Food Insecurity: No Food Insecurity (5/26/2022)    Received from [facility], [facility]    Food Insecurity     Currently or in the past 3 months, have you worried your food would run out before you had money to buy more?: No     In the past 12 months, have you run out of food or been unable to get more?: No   Transportation Needs: No Transportation Needs (5/26/2022)    Received from [facility], [facility]    Transportation Needs     Medical Transportation Needs?: Patient refused     Daily Living Transportation Needs? [Peds Only] : Patient refused    Received from [facility], [facility]    Social Connections    Received from [facility], [facility]    Housing Stability        REVIEW OF SYSTEMS:   GENERAL: Denies fever. Notes good appetite.   SKIN: no unusual skin lesions or rashes  HEENT: See HPI  LUNGS: No cough, shortness of breath, or wheezing.  CARDIOVASCULAR: No chest pain, palpitations  GI: No N/V/C/D.  NEURO: denies headaches or dizziness    EXAM:   /86 (BP Location: Left arm, Patient Position: Sitting, Cuff Size: adult)   Pulse 103   Temp 98.2 °F (36.8 °C) (Temporal)   Resp 18   Ht 5' 5\" (1.651 m)   Wt 140 lb (63.5 kg)   LMP 09/04/2024 (Exact Date)   SpO2 99%   BMI 23.30 kg/m²   GENERAL: well developed, well nourished,in no apparent distress  SKIN: no rashes,no suspicious lesions  HEAD: atraumatic, normocephalic  EYES: conjunctiva clear  EARS: Right TM: intact and erythematous, + bulging, no retraction. No erythema or swelling noted to ear canal or external ear.   Left TM: intact and without erythema, no bulging, no retraction,no effusion. No  erythema or swelling noted to ear canal or external ear.   NOSE: nostrils patent, no nasal mucus, nasal mucosa wnl  THROAT: oral mucosa pink, moist. Posterior pharynx not erythematous or injected. No exudates. No trismus. Uvula midline and without swelling. Voice normal/clear. No stridor.  NECK: supple, non-tender  LUNGS: clear to auscultation bilaterally, no wheezes or rhonchi. Breathing is non labored.  CARDIO: RRR without murmur  EXTREMITIES: no cyanosis, clubbing or edema  LYMPH: No lymphadenopathy.      ASSESSMENT AND PLAN:       ICD-10-CM    1. Non-recurrent acute suppurative otitis media of right ear without spontaneous rupture of tympanic membrane  H66.001 amoxicillin 500 MG Oral Cap        Discussed physical exam and hpi with pt. Pt has reassuring physical exam consistent with right OM. Lungs clear bilat. No respiratory distress noted. Treatment options discussed with patient and explained in detail. Will start amoxicillin today along with supportive care. The risks, benefits and potential side effects of possible medications were reviewed. Alternatives were discussed. Monitoring parameters and expected course outlined. Patient to call PCP or go to emergency department if symptoms fail to respond as outlined, or worsen in any way. The patient agreed with the plan.       Patient Instructions   1. Rest. Drink plenty of fluids.  2. Amoxicillin as prescribed.  3. Supportive care as discussed.  4. Use humidifier at home when possible.  5. Follow up with PMD in 3-4 days for reeval. Follow up sooner or go to the emergency department immediately if symptoms worsen, change, or if you have any concerns.

## 2025-01-18 ENCOUNTER — E-VISIT (OUTPATIENT)
Dept: TELEHEALTH | Age: 40
End: 2025-01-18
Payer: COMMERCIAL

## 2025-01-18 DIAGNOSIS — J01.90 ACUTE NON-RECURRENT SINUSITIS, UNSPECIFIED LOCATION: Primary | ICD-10-CM

## 2025-01-18 NOTE — PROGRESS NOTES
Katey Morrell is a 39 year old female.  HPI:   See answers to questions above.     Current Outpatient Medications   Medication Sig Dispense Refill    amoxicillin clavulanate 875-125 MG Oral Tab Take 1 tablet by mouth 2 (two) times daily for 7 days. 14 tablet 0    Multiple Vitamin (THERA/BETA-CAROTENE) Oral Tab Take 1 tablet by mouth.      meclizine 25 MG Oral Tab Take 1 tablet (25 mg total) by mouth 3 (three) times daily as needed for Dizziness. 20 tablet 0    Norethin Ace-Eth Estrad-FE 1-20 MG-MCG(24) Oral Tab Take 1 tablet by mouth daily.        Past Medical History:    Cardiomyopathy (HCC)    Oct 2017    Kidney infection    MRSA infection    Oct 2017 Hospitalized for 1 week    Psoriasis    Septic shock ([facility])    Oct 2017. Hospitalized for 1 week for MRSA mastitis. 5 weeks post partum.      Past Surgical History:   Procedure Laterality Date    Other surgical history      tubes both ears.     Transnasal eustach tube inflate        Family History   Problem Relation Age of Onset    Heart Disease Maternal Grandfather     Stroke Paternal Grandfather       Social History:  Social History     Socioeconomic History    Marital status:    Tobacco Use    Smoking status: Never    Smokeless tobacco: Never   Vaping Use    Vaping status: Never Used   Substance and Sexual Activity    Alcohol use: Yes    Drug use: No    Sexual activity: Yes     Partners: Male   Other Topics Concern    Caffeine Concern Yes    Exercise Yes    Seat Belt Yes   Social History Narrative    ** Merged History Encounter **          Social Drivers of Health     Financial Resource Strain: Not At Risk (5/26/2022)    Received from [facility], [facility]    Financial Resource Strain     How hard is it for you to pay for the very basics like food, housing, medical care, and heating?: Not hard at all   Food Insecurity: No Food Insecurity (5/26/2022)    [facility], [facility]    Food Insecurity     Currently or in the past 3 months, have you worried your food would run out before you had money to buy more?: No     In the past 12 months, have you run out of food or been unable to get more?: No   Transportation Needs: No Transportation Needs (5/26/2022)    Received from [facility], [facility]    Transportation Needs     Currently or in the past 3 months, has lack of transportation kept you from medical appointments, getting food or medicine, or providing care to a family member?: Unrecognized value     Has the lack of transportation kept you from meetings, work, or from getting things needed for daily living?: Unrecognized value     Medical Transportation Needs?: Patient refused     Daily Living Transportation Needs? [Peds Only] : Patient refused    Received from [facility], [facility]    Social Connections    Received from [facility], [facility]    Housing Stability         ASSESSMENT AND PLAN:     Encounter Diagnosis   Name Primary?    Acute non-recurrent sinusitis, unspecified location Yes           Meds & Refills for this Visit:  Requested Prescriptions     Signed Prescriptions Disp Refills    amoxicillin clavulanate 875-125 MG Oral Tab 14 tablet 0     Sig: Take 1 tablet by mouth 2 (two) times daily for 7 days.       Duration of  the service:  6 minutes    Patient advised to follow up with PCP if no improvement or worsening of symptoms  Refer to [employer] message for specific patient instructions

## 2025-08-31 ENCOUNTER — E-VISIT (OUTPATIENT)
Dept: TELEHEALTH | Age: 40
End: 2025-08-31

## 2025-08-31 DIAGNOSIS — B97.89 VIRAL SINUSITIS: Primary | ICD-10-CM

## 2025-08-31 DIAGNOSIS — J32.9 VIRAL SINUSITIS: Primary | ICD-10-CM
